# Patient Record
Sex: FEMALE | Race: WHITE | NOT HISPANIC OR LATINO | Employment: OTHER | URBAN - METROPOLITAN AREA
[De-identification: names, ages, dates, MRNs, and addresses within clinical notes are randomized per-mention and may not be internally consistent; named-entity substitution may affect disease eponyms.]

---

## 2023-11-06 ENCOUNTER — EVALUATION (OUTPATIENT)
Dept: OCCUPATIONAL THERAPY | Facility: CLINIC | Age: 68
End: 2023-11-06
Payer: MEDICARE

## 2023-11-06 DIAGNOSIS — M65.332 TRIGGER MIDDLE FINGER OF LEFT HAND: ICD-10-CM

## 2023-11-06 DIAGNOSIS — M65.331 TRIGGER MIDDLE FINGER OF RIGHT HAND: Primary | ICD-10-CM

## 2023-11-06 DIAGNOSIS — M65.30 TRIGGER FINGER: ICD-10-CM

## 2023-11-06 PROCEDURE — 97140 MANUAL THERAPY 1/> REGIONS: CPT | Mod: GO | Performed by: OCCUPATIONAL THERAPIST

## 2023-11-06 PROCEDURE — 97165 OT EVAL LOW COMPLEX 30 MIN: CPT | Mod: GO | Performed by: OCCUPATIONAL THERAPIST

## 2023-11-06 PROCEDURE — 97110 THERAPEUTIC EXERCISES: CPT | Mod: GO | Performed by: OCCUPATIONAL THERAPIST

## 2023-11-06 ASSESSMENT — PAIN SCALES - GENERAL: PAINLEVEL_OUTOF10: 5 - MODERATE PAIN

## 2023-11-06 ASSESSMENT — ACTIVITIES OF DAILY LIVING (ADL): EFFECT OF PAIN ON DAILY ACTIVITIES: PER PATIENT ABLE TO COMPENSATE

## 2023-11-06 ASSESSMENT — PAIN - FUNCTIONAL ASSESSMENT: PAIN_FUNCTIONAL_ASSESSMENT: 0-10

## 2023-11-06 NOTE — PROGRESS NOTES
Occupational Therapy    Evaluation/Treatment    Patient Name: Lottie Rodríguez  MRN: [id number]  : 1955  Today's Date: 23     Time Calculation  Start Time: 1420  Stop Time: 1510  Time Calculation (min): 50 min    Assessment:   -30/ Right 60/95/55  right release 23  -35 left PIP middle finger 60/95/55  Per patient right long finger PIP trigger release in 2023; left long finger locks a couple of times  a day;   OT Assessment Results: Decreased ADL status, Decreased upper extremity range of motion, Decreased upper extremity strength, Decreased fine motor control, Decreased IADLs  Strengths: Ability to acquire knowledge, Coping skills, Insight into problems     Right  37 lbs.; Left  42 lbs.; right 3 jaw 10 lbs.; Left 8 lbs.; difficult to wring out rag and lifting dogfood,   Plan:  1 x week for 4 weeks;           Subjective   Current Problem:  1. Trigger middle finger of right hand  Follow Up In Occupational Therapy      2. Trigger finger  Referral to Occupational Therapy      3. Trigger middle finger of left hand  Follow Up In Occupational Therapy        General:      General  Reason for Referral: Pain bilateral hands weakness and stiffness, loss of ADL function  Preferred Learning Style: verbal, visual, written  Precautions:  Splinting: recommending resting finger extension for night R and L long fingers  Precautions Comment: left finger locking, right PMhx of trigger release with scar tissue     Pain:  Pain Assessment  Pain Assessment: 0-10  Pain Score: 5 - Moderate pain  Pain Type: Acute pain  Pain Location: Finger (Comment which one) (Right Long and Left long finger at PIP and palmar)  Pain Orientation: Right, Left  Pain Frequency: Intermittent  Clinical Progression: Gradually worsening  Effect of Pain on Daily Activities: per patient able to compensate  Patient's Stated Pain Goal: No pain    Objective   Cognition:  Overall Cognitive Status: Within Functional Limits           Home  Living:  Type of Home: House  Lives With: Spouse  Prior Function:  Level of [place]: Independent with ADLs and functional transfers, Independent with homemaking with ambulation  Hand Dominance: Right  IADL History:  Occupation: Retired  ADL:  ADL Comments: self report completed UEFI  Splinting: use of coban and pulley ring for gripping, may benefit from PIP extension or Ted block at night        Therapy/Activity: Therapeutic Exercise  Therapeutic Exercise Performed: Yes  Therapeutic Exercise Activity 1: instructed with AROM of FDS flexion, intrinsic hook stretching completed 10 reps, hold, manual cues for MP extension, use of pen for assist, visual cues, flexor slides, flexion stretch.  Sensation:  Light Touch: No apparent deficits  Sensation Comment: light touch WNL       OP EDUCATION:  Education  Individual(s) Educated: Patient  Education Provided: Diagnosis & Precautions, Symptom management, Joint protection and energy conservation, Orthotics wearing schedule and precautions, Risk and benefits of OT discussed with patient or other, POC discussed and agreed upon  Home Program: AROM, Activity modification, Modalities, Orthotic wearing schedule, care and precautions, Tendon gliding, Wound/scar care, Handout issued  Diagnosis and Precautions: tendon adhesions, weakness of extension, good early flexion of LSM  Risk and Benefits Discussed with Patient/Caregiver/Other: yes  Patient/Caregiver Demonstrated Understanding: yes  Plan of Care Discussed and Agreed Upon: yes  Patient Response to Education: Patient/Caregiver Verbalized Understanding of Information, Patient/Caregiver Performed Return Demonstration of Exercises/Activities, Patient/Caregiver Asked Appropriate Questions    Goals:  Active       OT Problem       PATIENT WILL ACHIEVE bilateral  STRENGTH OF 50lbs. IN THE two position       Start:  11/06/23    Expected End:  01/06/24            PATIENT WILL DEMONSTRATE FULL ROM IN LONG FINGER FLEXION  bilateral (Progressing)       Start:  11/06/23    Expected End:  01/06/24            PATIENT WILL DEMONSTRATE FULL ROM IN LONG FINGER EXTENSION bilateral (Progressing)       Start:  11/06/23    Expected End:  01/06/24            PATIENT WILL SHOW A SIGNIFICANT CHANGE IN UEFI PATIENT REPORTED OUTCOME TOOL TO DEMONSTRATE SUBJECTIVE IMPROVEMENT       Start:  11/06/23    Expected End:  01/06/24            PATIENT WILL DEMONSTRATE INDEPENDENCE IN HOME PROGRAM FOR SUPPORT OF PROGRESSION (Progressing)       Start:  11/06/23    Expected End:  01/06/24            PATIENT WILL REPORT PAIN OF 0-1/10 DEMONSTRATING A REDUCTION OF OVERALL PAIN       Start:  11/06/23    Expected End:  01/06/24

## 2023-11-06 NOTE — Clinical Note
November 6, 2023     Patient: Lottie Rodríguez   YOB: 1955   Date of Visit: 11/6/2023       To Whom It May Concern:    It is my medical opinion that Lottie Rodríguez {Work release (duty restriction):33281}.    If you have any questions or concerns, please don't hesitate to call.         Sincerely,        Mariana Osorio , OT    CC: No Recipients

## 2023-11-06 NOTE — Clinical Note
November 6, 2023     Patient: Lottie Rodríguez   YOB: 1955   Date of Visit: 11/6/2023       To Whom it May Concern:    Lottie Rodríguez was seen in my clinic on 11/6/2023. She {Return to school/sport:62396}.    If you have any questions or concerns, please don't hesitate to call.         Sincerely,          Mariana Osorio , OT        CC: No Recipients

## 2023-11-14 ENCOUNTER — TREATMENT (OUTPATIENT)
Dept: OCCUPATIONAL THERAPY | Facility: CLINIC | Age: 68
End: 2023-11-14
Payer: MEDICARE

## 2023-11-14 ENCOUNTER — LAB (OUTPATIENT)
Dept: LAB | Facility: LAB | Age: 68
End: 2023-11-14
Payer: MEDICARE

## 2023-11-14 DIAGNOSIS — E11.9 TYPE 2 DIABETES MELLITUS WITHOUT COMPLICATIONS (MULTI): ICD-10-CM

## 2023-11-14 DIAGNOSIS — M65.331 TRIGGER MIDDLE FINGER OF RIGHT HAND: ICD-10-CM

## 2023-11-14 DIAGNOSIS — E83.42 HYPOMAGNESEMIA: Primary | ICD-10-CM

## 2023-11-14 DIAGNOSIS — M65.332 TRIGGER MIDDLE FINGER OF LEFT HAND: ICD-10-CM

## 2023-11-14 LAB
ANION GAP SERPL CALC-SCNC: 11 MMOL/L
BUN SERPL-MCNC: 14 MG/DL (ref 8–25)
CALCIUM SERPL-MCNC: 9.7 MG/DL (ref 8.5–10.4)
CHLORIDE SERPL-SCNC: 101 MMOL/L (ref 97–107)
CO2 SERPL-SCNC: 26 MMOL/L (ref 24–31)
CREAT SERPL-MCNC: 0.8 MG/DL (ref 0.4–1.6)
GFR SERPL CREATININE-BSD FRML MDRD: 80 ML/MIN/1.73M*2
GLUCOSE SERPL-MCNC: 135 MG/DL (ref 65–99)
MAGNESIUM SERPL-MCNC: 1.4 MG/DL (ref 1.6–3.1)
POTASSIUM SERPL-SCNC: 5.8 MMOL/L (ref 3.4–5.1)
SODIUM SERPL-SCNC: 138 MMOL/L (ref 133–145)

## 2023-11-14 PROCEDURE — 83735 ASSAY OF MAGNESIUM: CPT

## 2023-11-14 PROCEDURE — 97022 WHIRLPOOL THERAPY: CPT | Mod: GO,CO

## 2023-11-14 PROCEDURE — 36415 COLL VENOUS BLD VENIPUNCTURE: CPT

## 2023-11-14 PROCEDURE — 97110 THERAPEUTIC EXERCISES: CPT | Mod: 59,GO,CO

## 2023-11-14 PROCEDURE — 80048 BASIC METABOLIC PNL TOTAL CA: CPT

## 2023-11-14 PROCEDURE — 97763 ORTHC/PROSTC MGMT SBSQ ENC: CPT | Mod: GO,CO

## 2023-11-14 NOTE — PROGRESS NOTES
"Occupational Therapy Treatment    Patient Name: Lottie Rodríguez  MRN: [id number]  Today's Date: 11/14/2023    Time Calculation  Start Time: 1330  Stop Time: 1430  Time Calculation (min): 60 min  OT Modalities Time Entry  Whirlpool Time Entry: 10  OT Therapeutic Procedures Time Entry  Manual Therapy Time Entry: 10  Orthotic/Prosthetic Mgmt and/or Training (Subs Encounter) Time Entry: 10  Therapeutic Exercise Time Entry: 30  Visit number: 2 of 4  Insurance Type: [employer]    Subjective   Current Problem/Reason for visit:  Bilateral long finger trigger finger, surgery on R finger on 9/1/23     Referred by: Dr Faye    Patient reports \" I have been doing my exercises, massaging my right finger a lot\".     Performing HEP?: Yes    Pain:0/10 at start and at end of therapy 0/10  Pain Assessment  Patient's Stated Pain Goal: No pain    Objective   Writer reviewed goals with pt and progress towards goals at this time, pt verbalized understanding.  Physical Observation: Healed scar. Hard in some areas. Continues with contracture of bilateral long finger PIP's in flexion.   Edema: no edema    Sensory: intact  Numbness/Tingling: none  AROM measured on this date at the following increased levels:  R LF MP at 0/55(unchanged) PIP at -18/(inc 12 degrees)/75(dec 20) and DIP at 72(inc 17)   L LF MP at 0/50(dec 10), PIP at 0/85(dec 10), DIP at 0/ 45(inc 10)      Treatment:    Modalities:   AROM/tendon glides performed while in fluido therapy x 10 min to promote muscle movement during session.     Therapeutic Exercise:  PROM completed by writer with good response and good tolerance by pt.  Marker rolls x 10 reps with focus on 10 sec hold at end of DIP  Educated pt in finger lifts from table with focus on isolation of long finger. Only able to perform with assist of index finger at this time x 10 reps, 5 sec hold  Educated pt in importance of scar massage to promote finger ext.     Writer fabricated bilateral long  finger " orthosis to promote PIP ext.  , promote independence in self scare and reduce am stiffness and pain.   Pt verbalized satisfaction with fit. Pt to wear at night with finger sleeves provided today.     Post-tx pain:0/10    Assessment/Plan  Pt demonstrated good tolerance to PROM. Noted increased PIP ext in both long fingers from start to end of session. Min fatigue. Pt to follow through with use of modalities, added HEP program and splint wear at night.     OP EDUCATION:  Education  Individual(s) Educated: Patient  Education Provided: Diagnosis & Precautions, Symptom management, Joint protection and energy conservation, Orthotics wearing schedule and precautions, Risk and benefits of OT discussed with patient or other, POC discussed and agreed upon  Home Program: AROM, Activity modification, Modalities, Orthotic wearing schedule, care and precautions, Tendon gliding, Wound/scar care, Handout issued  Diagnosis and Precautions: tendon adhesions, weakness of extension, good early flexion of LSM  Risk and Benefits Discussed with Patient/Caregiver/Other: yes  Patient/Caregiver Demonstrated Understanding: yes  Plan of Care Discussed and Agreed Upon: yes  Patient Response to Education: Patient/Caregiver Verbalized Understanding of Information, Patient/Caregiver Performed Return Demonstration of Exercises/Activities, Patient/Caregiver Asked Appropriate Questions    Goals:  Active       OT Problem       PATIENT WILL ACHIEVE bilateral  STRENGTH OF 50lbs. IN THE two position (Progressing)       Start:  11/06/23    Expected End:  01/06/24            PATIENT WILL DEMONSTRATE FULL ROM IN LONG FINGER FLEXION bilateral (Progressing)       Start:  11/06/23    Expected End:  01/06/24            PATIENT WILL DEMONSTRATE FULL ROM IN LONG FINGER EXTENSION bilateral (Progressing)       Start:  11/06/23    Expected End:  01/06/24            PATIENT WILL SHOW A SIGNIFICANT CHANGE IN UEFI PATIENT REPORTED OUTCOME TOOL TO DEMONSTRATE  SUBJECTIVE IMPROVEMENT (Progressing)       Start:  11/06/23    Expected End:  01/06/24            PATIENT WILL DEMONSTRATE INDEPENDENCE IN HOME PROGRAM FOR SUPPORT OF PROGRESSION (Progressing)       Start:  11/06/23    Expected End:  01/06/24            PATIENT WILL REPORT PAIN OF 0-1/10 DEMONSTRATING A REDUCTION OF OVERALL PAIN (Met)       Start:  11/06/23    Expected End:  01/06/24    Resolved:  11/14/23

## 2023-11-21 ENCOUNTER — TREATMENT (OUTPATIENT)
Dept: OCCUPATIONAL THERAPY | Facility: CLINIC | Age: 68
End: 2023-11-21
Payer: MEDICARE

## 2023-11-21 DIAGNOSIS — M65.331 TRIGGER MIDDLE FINGER OF RIGHT HAND: ICD-10-CM

## 2023-11-21 DIAGNOSIS — M65.332 TRIGGER MIDDLE FINGER OF LEFT HAND: ICD-10-CM

## 2023-11-21 PROCEDURE — 97763 ORTHC/PROSTC MGMT SBSQ ENC: CPT | Mod: GO,CO

## 2023-11-21 PROCEDURE — 97022 WHIRLPOOL THERAPY: CPT | Mod: GO,CO

## 2023-11-21 PROCEDURE — 97110 THERAPEUTIC EXERCISES: CPT | Mod: GO,CO

## 2023-11-21 NOTE — PROGRESS NOTES
"Occupational Therapy Treatment    Patient Name: Lottie Rodríguez  MRN: [id number]  Today's Date: 11/21/2023    Time Calculation  Start Time: 1347  Stop Time: 1437  Time Calculation (min): 50 min  OT Modalities Time Entry  Whirlpool Time Entry: 10  OT Therapeutic Procedures Time Entry  Orthotic/Prosthetic Mgmt and/or Training (Subs Encounter) Time Entry: 10  Therapeutic Exercise Time Entry: 30  Visit number: 3 of 4  Insurance Type: [employer]    Subjective   Current Problem/Reason for visit:  Bilateral long finger trigger finger, surgery on R finger on 9/1/23     Referred by: Dr Yunier del valle 12/7/23    Patient reports \" I have been wearing my splints at night. Moving and stretching my finger a lot when I am watching tv.\"    Performing HEP?: Yes    Pain:0/10 at start and at end of therapy 0/10  Pain Assessment  Patient's Stated Pain Goal: No pain    Objective   Writer reviewed goals with pt and progress towards goals at this time, pt verbalized understanding.  Physical Observation: Healed scar. Hard in some areas. Reviewed and demonstrated scar massage and pressure required to reduce bulk. Pt verbalized understanding.   Continues with contracture of bilateral long finger PIP's in flexion. Pt is using heat prior to exercise 1-2 times daily and takes over the counter anti-inflammatory meds for all over pain up to twice a day.   Edema: Trace  edema    Sensory: intact  Numbness/Tingling: none  AROM measured on this date at the following increased levels:  R LF MP at 0/65(inc 10) PIP at -15/(inc 3 degrees)/80(inc 10) and DIP at 75(inc 3)     Treatment:    Modalities:   AROM/tendon glides performed while in fluido therapy x 10 min to promote muscle movement during session.     Therapeutic Exercise:  PROM completed by writer with good response and good tolerance by pt.  Marker rolls x 10 reps with focus on 10 sec hold at end of DIP  Educated pt in finger lifts from table with focus on isolation of long finger. Only able " to perform with assist of index finger at this time x 10 reps, 5 sec hold  Reinforced education of pt in importance of scar massage to promote finger ext.   Re-assessed fit of both LF splints, provided pt with replacement sleeves for wear under splint. Increased extension of L LF, pt verbalized comfort with fit. No changes needed for R LF.     Post-tx pain:0/10. Min fatigue.     Assessment/Plan  Pt demonstrated good tolerance to PROM. Noted increased PIP ext in both long fingers from start to end of session. Min fatigue. Pt to follow through with use of modalities, added HEP program and splint wear at night. Pt motivated to return to full use of Bilateral long fingers.     OP EDUCATION:  Education  Individual(s) Educated: Patient  Education Provided: Diagnosis & Precautions, Symptom management, Joint protection and energy conservation, Orthotics wearing schedule and precautions, Risk and benefits of OT discussed with patient or other, POC discussed and agreed upon  Home Program: AROM, Activity modification, Modalities, Orthotic wearing schedule, care and precautions, Tendon gliding, Wound/scar care, Handout issued  Diagnosis and Precautions: tendon adhesions, weakness of extension, good early flexion of LSM  Risk and Benefits Discussed with Patient/Caregiver/Other: yes  Patient/Caregiver Demonstrated Understanding: yes  Plan of Care Discussed and Agreed Upon: yes  Patient Response to Education: Patient/Caregiver Verbalized Understanding of Information, Patient/Caregiver Performed Return Demonstration of Exercises/Activities, Patient/Caregiver Asked Appropriate Questions    Goals:  Active       OT Problem       PATIENT WILL ACHIEVE bilateral  STRENGTH OF 50lbs. IN THE two position (Progressing)       Start:  11/06/23    Expected End:  01/06/24            PATIENT WILL DEMONSTRATE FULL ROM IN LONG FINGER FLEXION bilateral (Met)       Start:  11/06/23    Expected End:  01/06/24    Resolved:  11/21/23         PATIENT  WILL DEMONSTRATE FULL ROM IN LONG FINGER EXTENSION bilateral (Progressing)       Start:  11/06/23    Expected End:  01/06/24            PATIENT WILL SHOW A SIGNIFICANT CHANGE IN UEFI PATIENT REPORTED OUTCOME TOOL TO DEMONSTRATE SUBJECTIVE IMPROVEMENT (Progressing)       Start:  11/06/23    Expected End:  01/06/24            PATIENT WILL DEMONSTRATE INDEPENDENCE IN HOME PROGRAM FOR SUPPORT OF PROGRESSION (Met)       Start:  11/06/23    Expected End:  01/06/24    Resolved:  11/21/23         PATIENT WILL REPORT PAIN OF 0-1/10 DEMONSTRATING A REDUCTION OF OVERALL PAIN (Met)       Start:  11/06/23    Expected End:  01/06/24    Resolved:  11/14/23

## 2023-11-28 ENCOUNTER — APPOINTMENT (OUTPATIENT)
Dept: OCCUPATIONAL THERAPY | Facility: CLINIC | Age: 68
End: 2023-11-28
Payer: MEDICARE

## 2023-12-05 ENCOUNTER — TREATMENT (OUTPATIENT)
Dept: OCCUPATIONAL THERAPY | Facility: CLINIC | Age: 68
End: 2023-12-05
Payer: MEDICARE

## 2023-12-05 DIAGNOSIS — M65.332 TRIGGER MIDDLE FINGER OF LEFT HAND: ICD-10-CM

## 2023-12-05 DIAGNOSIS — M65.331 TRIGGER MIDDLE FINGER OF RIGHT HAND: ICD-10-CM

## 2023-12-05 PROCEDURE — 97110 THERAPEUTIC EXERCISES: CPT | Mod: GO,CO

## 2023-12-05 PROCEDURE — 97022 WHIRLPOOL THERAPY: CPT | Mod: GO,CO

## 2023-12-05 PROCEDURE — 97763 ORTHC/PROSTC MGMT SBSQ ENC: CPT | Mod: GO,CO

## 2023-12-05 NOTE — PROGRESS NOTES
"Occupational Therapy Treatment    Patient Name: Lottie Rodríguez  MRN: [id number]  Today's Date: 12/5/2023    Time Calculation  Start Time: 1337  Stop Time: 1424  Time Calculation (min): 47 min  OT Modalities Time Entry  Whirlpool Time Entry: 16  OT Therapeutic Procedures Time Entry  Orthotic/Prosthetic Mgmt and/or Training (Subs Encounter) Time Entry: 10  Therapeutic Exercise Time Entry: 21  Insurance:  Visit number: 4 of 4  Insurance Type: [employer]    Subjective   Current Problem/Reason for visit:  Bilateral long finger trigger finger, surgery on R finger on 9/1/23       Referred by: Yunier, follow up on 12/8/23    Patient reports \" I can feel a difference, I notice I can do a lot more and my fingers look better\". Pt wearing finger ext splints at night. Writer adjusted on this date to promote increased LF PIP ext. Pt also provided with one layer of coban under splint to keep them in place when wearing at night.     Performing HEP?: Yes    Pain:0/10  Pain Assessment  Patient's Stated Pain Goal: No pain    Objective     UEFI completed by pt at 80/80 today (was 61/80 at [facility])  Edema: trace to none, noted decrease since last session.     Sensory: intact  Numbness/Tingling: none noted.       AROM measured on this date at the following increased levels:  R LF PIP -12 ext(was -30) and L LF PIP ext -12(was -35) All other flexion/ext WFL.    measured today at :46# R hand(was 45) and 46# L hand (was 40)  3 point pinch measured today at:  13.5# in R hand (inc by 3.5) and 13.5 in L hand (inc 5.5)    Treatment:    Modalities:   AROM/tendon glides performed while in fluido therapy x 8  min  with B Ue's to promote muscle movement during session.     Therapeutic Exercise:  PROM completed by writer with good response and good tolerance by pt.  Marker rolls x 10 reps with focus on 10 sec hold at end of DIP  Pt completed finger lifts from table with focus on isolation of long finger. Only able to perform with assist of " index finger at this time x 10 reps, 5 sec hold  Reinforced education of pt in importance of scar massage to promote finger ext.     Re-assessed fit of both LF splints, provided pt with coban for wear under splint. Increased extension of both with focus on PIP. Pt verbalized comfort with wear.      Post-tx pain:0/10 No  fatigue.     Assessment/Plan  Pt demonstrated good tolerance to PROM. Noted increased PIP ext in both long fingers from start to end of session. Pt to follow through with use of modalities, added HEP program and splint wear at night. Pt motivated to return to full use of Bilateral long fingers.  To possibly provided pt with low resistance theraputty at next visit.     OP EDUCATION:  Education  Individual(s) Educated: Patient  Education Provided: Diagnosis & Precautions, Symptom management, Joint protection and energy conservation, Orthotics wearing schedule and precautions, Risk and benefits of OT discussed with patient or other, POC discussed and agreed upon  Home Program: AROM, Activity modification, Modalities, Orthotic wearing schedule, care and precautions, Tendon gliding, Wound/scar care, Handout issued  Diagnosis and Precautions: tendon adhesions, weakness of extension, good early flexion of LSM  Risk and Benefits Discussed with Patient/Caregiver/Other: yes  Patient/Caregiver Demonstrated Understanding: yes  Plan of Care Discussed and Agreed Upon: yes  Patient Response to Education: Patient/Caregiver Verbalized Understanding of Information, Patient/Caregiver Performed Return Demonstration of Exercises/Activities, Patient/Caregiver Asked Appropriate Questions    Goals:  Active       OT Problem       PATIENT WILL ACHIEVE bilateral  STRENGTH OF 50lbs. IN THE two position (Progressing)       Start:  11/06/23    Expected End:  01/06/24            PATIENT WILL DEMONSTRATE FULL ROM IN LONG FINGER FLEXION bilateral (Met)       Start:  11/06/23    Expected End:  01/06/24    Resolved:  11/21/23          PATIENT WILL DEMONSTRATE FULL ROM IN LONG FINGER EXTENSION bilateral (Progressing)       Start:  11/06/23    Expected End:  01/06/24            PATIENT WILL SHOW A SIGNIFICANT CHANGE IN UEFI PATIENT REPORTED OUTCOME TOOL TO DEMONSTRATE SUBJECTIVE IMPROVEMENT (Progressing)       Start:  11/06/23    Expected End:  01/06/24            PATIENT WILL DEMONSTRATE INDEPENDENCE IN HOME PROGRAM FOR SUPPORT OF PROGRESSION (Met)       Start:  11/06/23    Expected End:  01/06/24    Resolved:  11/21/23         PATIENT WILL REPORT PAIN OF 0-1/10 DEMONSTRATING A REDUCTION OF OVERALL PAIN (Met)       Start:  11/06/23    Expected End:  01/06/24    Resolved:  11/14/23

## 2023-12-12 ENCOUNTER — TREATMENT (OUTPATIENT)
Dept: OCCUPATIONAL THERAPY | Facility: CLINIC | Age: 68
End: 2023-12-12
Payer: MEDICARE

## 2023-12-12 DIAGNOSIS — M65.331 TRIGGER MIDDLE FINGER OF RIGHT HAND: ICD-10-CM

## 2023-12-12 DIAGNOSIS — M65.332 TRIGGER MIDDLE FINGER OF LEFT HAND: ICD-10-CM

## 2023-12-12 PROCEDURE — 97140 MANUAL THERAPY 1/> REGIONS: CPT | Mod: GO | Performed by: OCCUPATIONAL THERAPIST

## 2023-12-12 PROCEDURE — 97110 THERAPEUTIC EXERCISES: CPT | Mod: GO | Performed by: OCCUPATIONAL THERAPIST

## 2023-12-12 PROCEDURE — 97530 THERAPEUTIC ACTIVITIES: CPT | Mod: GO | Performed by: OCCUPATIONAL THERAPIST

## 2023-12-12 ASSESSMENT — PAIN SCALES - GENERAL: PAINLEVEL_OUTOF10: 1

## 2023-12-12 ASSESSMENT — PAIN DESCRIPTION - DESCRIPTORS: DESCRIPTORS: SORE;TENDER

## 2023-12-12 ASSESSMENT — PAIN - FUNCTIONAL ASSESSMENT: PAIN_FUNCTIONAL_ASSESSMENT: 0-10

## 2023-12-12 NOTE — PROGRESS NOTES
Occupational Therapy    Treatment    Patient Name: Lottie Rodríguez  MRN: [id number]  : 1955  Today's Date: 23     Time Calculation  Start Time: 1345  Stop Time: 1430  Time Calculation (min): 45 min    Assessment: Recommending to continue for 3 more visits for recheck of dynamic splint fit and PIP joint ROM; measure  next visit.   Pt is doing well, slight PIP joint contracture, order for PIP ext dynamic splint issued and fitted by [facility] order completed; improving with less PIP extension and increasing CUEVAS   RIGHT AROM -25/95 PIP    Left today:  LEFT -20 PIP extension with left long finger 60/95/55 CUEVAS 190 degrees  (was -35/95 PIP middle finger )  Per patient right long finger PIP trigger release in 2023; left finger no longer locking daily;    OT Assessment Results: Decreased ADL status, Decreased upper extremity range of motion, Decreased upper extremity strength, Decreased fine motor control, Decreased IADLs     Right  37 lbs.; Left  42 lbs.; right 3 jaw 10 lbs.; Left 8 lbs.; difficult to wring out rag and lifting dogfood,   Plan:  1 x week for 4 weeks;  this is 4th visit, continue for 3 more visits.          Subjective   Current Problem:  1. Trigger middle finger of right hand  Follow Up In Occupational Therapy      2. Trigger middle finger of left hand  Follow Up In Occupational Therapy        General:      General  Reason for Referral: Pain bilateral hands weakness and stiffness, loss of ADL function  Preferred Learning Style: verbal, visual, written  Precautions:  Splinting: right and left PIP extension splint at night  Precautions Comment: patient reports less left finger locking, and right long finger feeling better, splint at night     Pain:  Pain Assessment  Pain Assessment: 0-10  Pain Score: 1  Pain Location: Finger (Comment which one)  Pain Descriptors: Sore, Tender  Patient's Stated Pain Goal: No pain    Objective      Splinting: use of coban and pulley ring for gripping, may  benefit from PIP extension or Ted block at night  Location: dynamic ext PIP  Type: finger extension  Splinting Education: Fitting, Wear schedule, Precautions, [place], [place]  Splinting Prefabricated: Other (Comment)  Splinting Comments: LMB for PIP Bregs     Therapy/Activity: Therapeutic Exercise  Therapeutic Exercise Performed: Yes  Therapeutic Exercise Activity 1: Pt completed  AROM of FDS flexion, intrinsic hook stretching completed 10 reps, hold, manual cues for MP extension, use of pen for assist, visual cues, flexor slides, flexion stretch.  Therapeutic Exercise Activity 2: Educated pt in marker rolls on table for long extensor stretch.     OP EDUCATION:  Education  Individual(s) Educated: Patient  Education Provided: Diagnosis & Precautions, Symptom management, Joint protection and energy conservation, Orthotics wearing schedule and precautions, Risk and benefits of OT discussed with patient or other, POC discussed and agreed upon  Home Program: AROM, Activity modification, Modalities, Orthotic wearing schedule, care and precautions, Tendon gliding, Handout issued, AAROM  Risk and Benefits Discussed with Patient/Caregiver/Other: yes  Patient/Caregiver Demonstrated Understanding: yes  Plan of Care Discussed and Agreed Upon: yes  Patient Response to Education: Patient/Caregiver Verbalized Understanding of Information, Patient/Caregiver Performed Return Demonstration of Exercises/Activities, Patient/Caregiver Asked Appropriate Questions    Goals:  Active       OT Problem       PATIENT WILL ACHIEVE bilateral  STRENGTH OF 50lbs. IN THE two position (Progressing)       Start:  11/06/23    Expected End:  01/06/24            PATIENT WILL DEMONSTRATE FULL ROM IN LONG FINGER FLEXION bilateral (Met)       Start:  11/06/23    Expected End:  01/06/24    Resolved:  11/21/23         PATIENT WILL DEMONSTRATE FULL ROM IN LONG FINGER EXTENSION bilateral (Progressing)       Start:  11/06/23    Expected End:  01/06/24             PATIENT WILL SHOW A SIGNIFICANT CHANGE IN UEFI PATIENT REPORTED OUTCOME TOOL TO DEMONSTRATE SUBJECTIVE IMPROVEMENT (Progressing)       Start:  11/06/23    Expected End:  01/06/24            PATIENT WILL DEMONSTRATE INDEPENDENCE IN HOME PROGRAM FOR SUPPORT OF PROGRESSION (Met)       Start:  11/06/23    Expected End:  01/06/24    Resolved:  11/21/23         PATIENT WILL REPORT PAIN OF 0-1/10 DEMONSTRATING A REDUCTION OF OVERALL PAIN (Met)       Start:  11/06/23    Expected End:  01/06/24    Resolved:  11/14/23

## 2023-12-19 ENCOUNTER — TREATMENT (OUTPATIENT)
Dept: OCCUPATIONAL THERAPY | Facility: CLINIC | Age: 68
End: 2023-12-19
Payer: MEDICARE

## 2023-12-19 DIAGNOSIS — M65.331 TRIGGER MIDDLE FINGER OF RIGHT HAND: ICD-10-CM

## 2023-12-19 DIAGNOSIS — M65.332 TRIGGER MIDDLE FINGER OF LEFT HAND: ICD-10-CM

## 2023-12-19 PROCEDURE — 97140 MANUAL THERAPY 1/> REGIONS: CPT | Mod: GO | Performed by: OCCUPATIONAL THERAPIST

## 2023-12-19 PROCEDURE — 97530 THERAPEUTIC ACTIVITIES: CPT | Mod: GO | Performed by: OCCUPATIONAL THERAPIST

## 2023-12-19 PROCEDURE — 97110 THERAPEUTIC EXERCISES: CPT | Mod: GO | Performed by: OCCUPATIONAL THERAPIST

## 2023-12-19 ASSESSMENT — PAIN SCALES - GENERAL: PAINLEVEL_OUTOF10: 0 - NO PAIN

## 2023-12-19 ASSESSMENT — PAIN - FUNCTIONAL ASSESSMENT: PAIN_FUNCTIONAL_ASSESSMENT: 0-10

## 2023-12-19 NOTE — PROGRESS NOTES
Occupational Therapy    Treatment    Patient Name: Lottie Rodríguez  MRN: [id number]  : 1955  Today's Date: 23     Time Calculation  Start Time: 1335  Stop Time: 1420  Time Calculation (min): 45 min    Assessment: Recommending to continue for 1 more visits for recheck of dynamic splint fit and PIP joint ROM; measure  next visit.   Pt is doing well, completed most goals with good understanding of her management and ROM exercises; RIGHT AROM -20/100 PIP  70/100/55 CUEVAS 205   Left today:  LEFT -18 PIP extension with left long finger 75/105/50 CUEVAS 210 degrees  (was -35/95 PIP middle finger )  Per patient right long finger PIP trigger release in 2023; left finger no longer locking daily;    OT Assessment Results: Decreased ADL status, Decreased upper extremity range of motion, Decreased upper extremity strength, Decreased fine motor control, Decreased IADLs     Right  47 lbs.; Left  45 lbs.; right 3 jaw 10 lbs.; Left 8 lbs.; improved opening doors and wringing out rag and improved lifting dogfood,   Plan:  1 x week for 7 weeks;  this is  6th visit, continue for 1 more visits.          Subjective   Current Problem:  1. Trigger middle finger of right hand  Follow Up In Occupational Therapy    Follow Up In Occupational Therapy      2. Trigger middle finger of left hand  Follow Up In Occupational Therapy    Follow Up In Occupational Therapy        General:      General  Reason for Referral: Pain bilateral hands weakness and stiffness, loss of ADL function  Referred By: Dr. Faye  Preferred Learning Style: verbal, visual, written  Precautions:        Pain:  Pain Assessment  Pain Assessment: 0-10  Pain Score: 0 - No pain  Patient's Stated Pain Goal: No pain    Objective      Splinting: use of coban and pulley ring for gripping, continue PIP extension and extension splints at night  Location: continue with dynamic  Type: PIP extension  Splinting Education: Fitting, Wear schedule, Precautions,  [place], [place]  Splinting Prefabricated: Other (Comment)     Therapy/Activity: Therapeutic Exercise  Therapeutic Exercise Performed: Yes  Therapeutic Exercise Activity 1: Pt completed  AROM of FDS flexion, intrinsic hook stretching completed 10 reps, hold, manual cues for MP extension, use of pen for assist, visual cues, flexor slides, flexion stretch.  Therapeutic Exercise Activity 2: completed 1 min bilateral hands with marker rolls on table for long extensor stretch.     OP EDUCATION:  Education  Individual(s) Educated: Patient  Education Provided: Diagnosis & Precautions, Symptom management, Joint protection and energy conservation, Orthotics wearing schedule and precautions, Risk and benefits of OT discussed with patient or other, POC discussed and agreed upon  Home Program: AROM, Activity modification, Modalities, Orthotic wearing schedule, care and precautions, Tendon gliding, Handout issued, AAROM  Risk and Benefits Discussed with Patient/Caregiver/Other: yes  Patient/Caregiver Demonstrated Understanding: yes  Plan of Care Discussed and Agreed Upon: yes  Patient Response to Education: Patient/Caregiver Verbalized Understanding of Information, Patient/Caregiver Performed Return Demonstration of Exercises/Activities, Patient/Caregiver Asked Appropriate Questions    Goals:  Active       OT Problem       PATIENT WILL ACHIEVE bilateral  STRENGTH OF 50lbs. IN THE two position (Progressing)       Start:  11/06/23    Expected End:  01/06/24            PATIENT WILL DEMONSTRATE FULL ROM IN LONG FINGER FLEXION bilateral (Met)       Start:  11/06/23    Expected End:  01/06/24    Resolved:  11/21/23         PATIENT WILL DEMONSTRATE FULL ROM IN LONG FINGER EXTENSION bilateral (Progressing)       Start:  11/06/23    Expected End:  01/06/24            PATIENT WILL SHOW A SIGNIFICANT CHANGE IN UEFI PATIENT REPORTED OUTCOME TOOL TO DEMONSTRATE SUBJECTIVE IMPROVEMENT (Met)       Start:  11/06/23    Expected End:   01/06/24    Resolved:  12/19/23         PATIENT WILL DEMONSTRATE INDEPENDENCE IN HOME PROGRAM FOR SUPPORT OF PROGRESSION (Met)       Start:  11/06/23    Expected End:  01/06/24    Resolved:  11/21/23         PATIENT WILL REPORT PAIN OF 0-1/10 DEMONSTRATING A REDUCTION OF OVERALL PAIN (Met)       Start:  11/06/23    Expected End:  01/06/24    Resolved:  11/14/23

## 2024-01-02 ENCOUNTER — TREATMENT (OUTPATIENT)
Dept: OCCUPATIONAL THERAPY | Facility: CLINIC | Age: 69
End: 2024-01-02
Payer: MEDICARE

## 2024-01-02 DIAGNOSIS — M65.331 TRIGGER MIDDLE FINGER OF RIGHT HAND: ICD-10-CM

## 2024-01-02 DIAGNOSIS — M65.332 TRIGGER MIDDLE FINGER OF LEFT HAND: ICD-10-CM

## 2024-01-02 PROCEDURE — 97110 THERAPEUTIC EXERCISES: CPT | Mod: GO,CO

## 2024-01-02 PROCEDURE — 97022 WHIRLPOOL THERAPY: CPT | Mod: GO,CO

## 2024-01-02 ASSESSMENT — PAIN SCALES - GENERAL: PAINLEVEL_OUTOF10: 0 - NO PAIN

## 2024-01-02 ASSESSMENT — PAIN - FUNCTIONAL ASSESSMENT: PAIN_FUNCTIONAL_ASSESSMENT: 0-10

## 2024-01-02 NOTE — PROGRESS NOTES
"Occupational Therapy    Occupational Therapy Discharge [facility]    Patient Name: Lottie Rodríguez  MRN: [id number]  Today's Date: 1/2/2024    Subjective   Current Problem/Reason for visit:  Bilateral long finger trigger finger, surgery on R finger on 9/1/23        Referred by: Yunier, discharged from doctor.      Patient reports \" I am doing really good. \" Pt wearing finger ext splints at night. No problems noted with fit. Pt Using heat PRN.Goals reviewed with pt, she verbalized understanding.   Pt wearing dynamic PIP ext splint during the day and stated \" I feel like it is really helping\".   Pain:  Pain Assessment  Pain Assessment: 0-10  Pain Score: 0 - No pain  Patient's Stated Pain Goal: No pain    Objective   Interventions:  Scar massage, manual, fluido therapy, splinting, ultrasound, PROM, AROM, activity modification, ther exer, there act. Wound care and tendon gliding     Extremity Assessments:  UEFI completed by pt at 80/80  (was 61/80 at eval)  Sensory: intact  Numbness/Tingling: none noted.   Edema, none noted.   Gross grasp measured today at  35 #(was 37 at eval) in R UE and 44#(was 42 at eval)   in L UE     AROM measured on this date at the following increased levels:  R LF PIP -12 ext(was -30) and L LF PIP ext -12(was -35) All other flexion/ext WFL.   3 point pinch measured at:  13.5# in R hand (inc by 3.5) and 13.5 in L hand (inc 5.5)       Assessment/Plan      Pt feels like she has all of the tools in place to follow through with HEP. Stated ' my Left hand looks really good, even though I didn't have surgery on that one.\" Pt motivated to return to full function, wearing splints at night. Recommend discharge from OT services on this date due to met max function towards all goals.           Goals:  Active       OT Problem       PATIENT WILL ACHIEVE bilateral  STRENGTH OF 50lbs. IN THE two position (Progressing)       Start:  11/06/23    Expected End:  01/06/24            PATIENT WILL DEMONSTRATE FULL " ROM IN LONG FINGER FLEXION bilateral (Met)       Start:  11/06/23    Expected End:  01/06/24    Resolved:  11/21/23         PATIENT WILL DEMONSTRATE FULL ROM IN LONG FINGER EXTENSION bilateral (Progressing)       Start:  11/06/23    Expected End:  01/06/24            PATIENT WILL SHOW A SIGNIFICANT CHANGE IN UEFI PATIENT REPORTED OUTCOME TOOL TO DEMONSTRATE SUBJECTIVE IMPROVEMENT (Met)       Start:  11/06/23    Expected End:  01/06/24    Resolved:  12/19/23         PATIENT WILL DEMONSTRATE INDEPENDENCE IN HOME PROGRAM FOR SUPPORT OF PROGRESSION (Met)       Start:  11/06/23    Expected End:  01/06/24    Resolved:  11/21/23         PATIENT WILL REPORT PAIN OF 0-1/10 DEMONSTRATING A REDUCTION OF OVERALL PAIN (Met)       Start:  11/06/23    Expected End:  01/06/24    Resolved:  11/14/23

## 2024-01-02 NOTE — PROGRESS NOTES
"Occupational Therapy    Occupational Therapy Treatment    Patient Name: Lottie Rodríguez  MRN: [id number]  Today's Date: 1/2/2024    Time Calculation  Start Time: 1352  Stop Time: 1428  Time Calculation (min): 36 min  OT Modalities Time Entry  [place] Time Entry: 20  OT Therapeutic Procedures Time Entry  Therapeutic Exercise Time Entry: 16  Insurance:  Visit number: 7 of 7  Insurance Type: [employer]    Subjective   Current Problem/Reason for visit:  Bilateral long finger trigger finger, surgery on R finger on 9/1/23       Referred by: Yunier, discharged from doctor.     Patient reports \" I am doing really good. \" Pt wearing finger ext splints at night. No problems noted with fit. Pt Using heat PRN.Goals reviewed with pt, she verbalized understanding.   Pt wearing dynamic PIP ext splint during the day and stated \" I feel like it is really helping\".     Performing HEP?: Yes    Pain:0/10  Pain Assessment  Pain Assessment: 0-10  Pain Score: 0 - No pain  Patient's Stated Pain Goal: No pain    Objective     UEFI completed by pt at 80/80  (was 61/80 at eval)  Sensory: intact  Numbness/Tingling: none noted.   Edema, none noted.   Gross grasp measured today at  35 #(was 37 at eval) in R UE and 44#(was 42 at eval)   in L UE    AROM measured on this date at the following increased levels:  R LF PIP -12 ext(was -30) and L LF PIP ext -12(was -35) All other flexion/ext WFL.   3 point pinch measured at:  13.5# in R hand (inc by 3.5) and 13.5 in L hand (inc 5.5)    Treatment:    Modalities:   AROM/tendon glides performed while in fluido therapy x 10  min  with   B Ue's to promote muscle movement during session.     Therapeutic Exercise:  Pt completed 10 reps of finger lifts, marker rolls and PIP extension against table with 5 sec  hold. Continuing as part of HEP   Reviewed existing HEP's for theraputty, isometric foam and rubber band ext. No problems noted.     Post-tx pain:0/10 No  fatigue.     Assessment/Plan   Pt feels " "like she has all of the tools in place to follow through with HEP. Stated ' my Left hand looks really good, even though I didn't have surgery on that one.\" Pt motivated to return to full function, wearing splints at night. Recommend discharge from OT services on this date due to met max function towards all goals.     Goals:  OT Problem         PATIENT WILL ACHIEVE bilateral  STRENGTH OF 50lbs. IN THE two position (Progressing)         Start:  11/06/23    Expected End:  01/06/24              PATIENT WILL DEMONSTRATE FULL ROM IN LONG FINGER FLEXION bilateral (Met)         Start:  11/06/23    Expected End:  01/06/24    Resolved:  11/21/23           PATIENT WILL DEMONSTRATE FULL ROM IN LONG FINGER EXTENSION bilateral (Progressing)         Start:  11/06/23    Expected End:  01/06/24              PATIENT WILL SHOW A SIGNIFICANT CHANGE IN UEFI PATIENT REPORTED OUTCOME TOOL TO DEMONSTRATE SUBJECTIVE IMPROVEMENT (met)         Start:  11/06/23    Expected End:  01/06/24              PATIENT WILL DEMONSTRATE INDEPENDENCE IN HOME PROGRAM FOR SUPPORT OF PROGRESSION (Met)         Start:  11/06/23    Expected End:  01/06/24    Resolved:  11/21/23           PATIENT WILL REPORT PAIN OF 0-1/10 DEMONSTRATING A REDUCTION OF OVERALL PAIN (Met)         Start:  11/06/23    Expected End:  01/06/24    Resolved:  11/14/23              "

## 2024-01-04 ENCOUNTER — PROCEDURE VISIT (OUTPATIENT)
Dept: ORTHOPEDIC SURGERY | Facility: CLINIC | Age: 69
End: 2024-01-04
Payer: MEDICARE

## 2024-01-25 ENCOUNTER — LAB (OUTPATIENT)
Dept: LAB | Facility: LAB | Age: 69
End: 2024-01-25
Payer: MEDICARE

## 2024-01-25 DIAGNOSIS — E11.8 TYPE 2 DIABETES MELLITUS WITH UNSPECIFIED COMPLICATIONS (MULTI): Primary | ICD-10-CM

## 2024-01-25 LAB
ANION GAP SERPL CALC-SCNC: 12 MMOL/L
BUN SERPL-MCNC: 26 MG/DL (ref 8–25)
CALCIUM SERPL-MCNC: 10 MG/DL (ref 8.5–10.4)
CHLORIDE SERPL-SCNC: 101 MMOL/L (ref 97–107)
CO2 SERPL-SCNC: 26 MMOL/L (ref 24–31)
CREAT SERPL-MCNC: 1.2 MG/DL (ref 0.4–1.6)
EGFRCR SERPLBLD CKD-EPI 2021: 49 ML/MIN/1.73M*2
EST. AVERAGE GLUCOSE BLD GHB EST-MCNC: 143 MG/DL
GLUCOSE SERPL-MCNC: 165 MG/DL (ref 65–99)
HBA1C MFR BLD: 6.6 %
MAGNESIUM SERPL-MCNC: 1.8 MG/DL (ref 1.6–3.1)
POTASSIUM SERPL-SCNC: 6.1 MMOL/L (ref 3.4–5.1)
SODIUM SERPL-SCNC: 139 MMOL/L (ref 133–145)

## 2024-01-25 PROCEDURE — 80048 BASIC METABOLIC PNL TOTAL CA: CPT

## 2024-01-25 PROCEDURE — 83036 HEMOGLOBIN GLYCOSYLATED A1C: CPT

## 2024-01-25 PROCEDURE — 36415 COLL VENOUS BLD VENIPUNCTURE: CPT

## 2024-01-25 PROCEDURE — 83735 ASSAY OF MAGNESIUM: CPT

## 2024-02-06 ENCOUNTER — LAB (OUTPATIENT)
Dept: LAB | Facility: LAB | Age: 69
End: 2024-02-06
Payer: MEDICARE

## 2024-02-06 DIAGNOSIS — E87.5 HYPERKALEMIA: Primary | ICD-10-CM

## 2024-02-06 LAB
ANION GAP SERPL CALC-SCNC: 16 MMOL/L
BUN SERPL-MCNC: 26 MG/DL (ref 8–25)
CALCIUM SERPL-MCNC: 9.2 MG/DL (ref 8.5–10.4)
CHLORIDE SERPL-SCNC: 101 MMOL/L (ref 97–107)
CO2 SERPL-SCNC: 24 MMOL/L (ref 24–31)
CREAT SERPL-MCNC: 1.4 MG/DL (ref 0.4–1.6)
EGFRCR SERPLBLD CKD-EPI 2021: 41 ML/MIN/1.73M*2
GLUCOSE SERPL-MCNC: 179 MG/DL (ref 65–99)
POTASSIUM SERPL-SCNC: 4.9 MMOL/L (ref 3.4–5.1)
SODIUM SERPL-SCNC: 141 MMOL/L (ref 133–145)

## 2024-02-06 PROCEDURE — 80048 BASIC METABOLIC PNL TOTAL CA: CPT

## 2024-02-06 PROCEDURE — 36415 COLL VENOUS BLD VENIPUNCTURE: CPT

## 2024-04-24 ENCOUNTER — LAB (OUTPATIENT)
Dept: LAB | Facility: LAB | Age: 69
End: 2024-04-24
Payer: MEDICARE

## 2024-04-24 DIAGNOSIS — E11.8 TYPE 2 DIABETES MELLITUS WITH UNSPECIFIED COMPLICATIONS (MULTI): Primary | ICD-10-CM

## 2024-04-24 LAB
ALBUMIN SERPL-MCNC: 4.5 G/DL (ref 3.5–5)
ALP BLD-CCNC: 68 U/L (ref 35–125)
ALT SERPL-CCNC: 13 U/L (ref 5–40)
ANION GAP SERPL CALC-SCNC: 15 MMOL/L
AST SERPL-CCNC: 14 U/L (ref 5–40)
BASOPHILS # BLD AUTO: 0.06 X10*3/UL (ref 0–0.1)
BASOPHILS NFR BLD AUTO: 0.9 %
BILIRUB SERPL-MCNC: 0.4 MG/DL (ref 0.1–1.2)
BUN SERPL-MCNC: 19 MG/DL (ref 8–25)
CALCIUM SERPL-MCNC: 9.9 MG/DL (ref 8.5–10.4)
CHLORIDE SERPL-SCNC: 103 MMOL/L (ref 97–107)
CHOLEST SERPL-MCNC: 165 MG/DL (ref 133–200)
CHOLEST/HDLC SERPL: 3.5 {RATIO}
CO2 SERPL-SCNC: 23 MMOL/L (ref 24–31)
CREAT SERPL-MCNC: 0.8 MG/DL (ref 0.4–1.6)
EGFRCR SERPLBLD CKD-EPI 2021: 80 ML/MIN/1.73M*2
EOSINOPHIL # BLD AUTO: 0.33 X10*3/UL (ref 0–0.7)
EOSINOPHIL NFR BLD AUTO: 4.7 %
ERYTHROCYTE [DISTWIDTH] IN BLOOD BY AUTOMATED COUNT: 12.3 % (ref 11.5–14.5)
EST. AVERAGE GLUCOSE BLD GHB EST-MCNC: 148 MG/DL
GLUCOSE SERPL-MCNC: 166 MG/DL (ref 65–99)
HBA1C MFR BLD: 6.8 %
HCT VFR BLD AUTO: 38.2 % (ref 36–46)
HDLC SERPL-MCNC: 47 MG/DL
HGB BLD-MCNC: 12.1 G/DL (ref 12–16)
IMM GRANULOCYTES # BLD AUTO: 0.04 X10*3/UL (ref 0–0.7)
IMM GRANULOCYTES NFR BLD AUTO: 0.6 % (ref 0–0.9)
LDLC SERPL CALC-MCNC: 58 MG/DL (ref 65–130)
LYMPHOCYTES # BLD AUTO: 1.63 X10*3/UL (ref 1.2–4.8)
LYMPHOCYTES NFR BLD AUTO: 23.2 %
MCH RBC QN AUTO: 33 PG (ref 26–34)
MCHC RBC AUTO-ENTMCNC: 31.7 G/DL (ref 32–36)
MCV RBC AUTO: 104 FL (ref 80–100)
MONOCYTES # BLD AUTO: 0.52 X10*3/UL (ref 0.1–1)
MONOCYTES NFR BLD AUTO: 7.4 %
NEUTROPHILS # BLD AUTO: 4.46 X10*3/UL (ref 1.2–7.7)
NEUTROPHILS NFR BLD AUTO: 63.2 %
NRBC BLD-RTO: 0 /100 WBCS (ref 0–0)
PLATELET # BLD AUTO: 242 X10*3/UL (ref 150–450)
POTASSIUM SERPL-SCNC: 5.4 MMOL/L (ref 3.4–5.1)
PROT SERPL-MCNC: 7 G/DL (ref 5.9–7.9)
RBC # BLD AUTO: 3.67 X10*6/UL (ref 4–5.2)
SODIUM SERPL-SCNC: 141 MMOL/L (ref 133–145)
TRIGL SERPL-MCNC: 300 MG/DL (ref 40–150)
TSH SERPL DL<=0.05 MIU/L-ACNC: 0.75 MIU/L (ref 0.27–4.2)
WBC # BLD AUTO: 7 X10*3/UL (ref 4.4–11.3)

## 2024-04-24 PROCEDURE — 80053 COMPREHEN METABOLIC PANEL: CPT

## 2024-04-24 PROCEDURE — 84443 ASSAY THYROID STIM HORMONE: CPT

## 2024-04-24 PROCEDURE — 85025 COMPLETE CBC W/AUTO DIFF WBC: CPT

## 2024-04-24 PROCEDURE — 36415 COLL VENOUS BLD VENIPUNCTURE: CPT

## 2024-04-24 PROCEDURE — 80061 LIPID PANEL: CPT

## 2024-04-24 PROCEDURE — 83036 HEMOGLOBIN GLYCOSYLATED A1C: CPT

## 2024-05-31 ENCOUNTER — HOSPITAL ENCOUNTER (INPATIENT)
Facility: HOSPITAL | Age: 69
LOS: 3 days | Discharge: HOME | DRG: 908 | End: 2024-06-03
Attending: INTERNAL MEDICINE | Admitting: INTERNAL MEDICINE
Payer: MEDICARE

## 2024-05-31 ENCOUNTER — HOSPITAL ENCOUNTER (OUTPATIENT)
Dept: RADIOLOGY | Facility: EXTERNAL LOCATION | Age: 69
Discharge: HOME | End: 2024-05-31
Payer: MEDICARE

## 2024-05-31 DIAGNOSIS — L03.031 CELLULITIS AND ABSCESS OF TOE OF RIGHT FOOT: Primary | ICD-10-CM

## 2024-05-31 DIAGNOSIS — N28.9 ABNORMAL RENAL FUNCTION: ICD-10-CM

## 2024-05-31 DIAGNOSIS — M79.5 RETAINED FOREIGN BODY OF FOOT: ICD-10-CM

## 2024-05-31 DIAGNOSIS — M79.89 SWELLING OF RIGHT FOOT: ICD-10-CM

## 2024-05-31 DIAGNOSIS — D50.9 MICROCYTIC ANEMIA: ICD-10-CM

## 2024-05-31 DIAGNOSIS — L02.611 CELLULITIS AND ABSCESS OF TOE OF RIGHT FOOT: Primary | ICD-10-CM

## 2024-05-31 LAB
ALBUMIN SERPL-MCNC: 4.6 G/DL (ref 3.5–5)
ALP BLD-CCNC: 75 U/L (ref 35–125)
ALT SERPL-CCNC: 10 U/L (ref 5–40)
ANION GAP SERPL CALC-SCNC: 13 MMOL/L
AST SERPL-CCNC: 13 U/L (ref 5–40)
BASOPHILS # BLD AUTO: 0.05 X10*3/UL (ref 0–0.1)
BASOPHILS NFR BLD AUTO: 0.4 %
BILIRUB SERPL-MCNC: 0.8 MG/DL (ref 0.1–1.2)
BUN SERPL-MCNC: 15 MG/DL (ref 8–25)
CALCIUM SERPL-MCNC: 9.8 MG/DL (ref 8.5–10.4)
CHLORIDE SERPL-SCNC: 97 MMOL/L (ref 97–107)
CO2 SERPL-SCNC: 24 MMOL/L (ref 24–31)
CREAT SERPL-MCNC: 0.8 MG/DL (ref 0.4–1.6)
EGFRCR SERPLBLD CKD-EPI 2021: 80 ML/MIN/1.73M*2
EOSINOPHIL # BLD AUTO: 0.08 X10*3/UL (ref 0–0.7)
EOSINOPHIL NFR BLD AUTO: 0.6 %
ERYTHROCYTE [DISTWIDTH] IN BLOOD BY AUTOMATED COUNT: 12.3 % (ref 11.5–14.5)
GLUCOSE BLD MANUAL STRIP-MCNC: 179 MG/DL (ref 74–99)
GLUCOSE SERPL-MCNC: 166 MG/DL (ref 65–99)
HCT VFR BLD AUTO: 37.4 % (ref 36–46)
HGB BLD-MCNC: 12.3 G/DL (ref 12–16)
IMM GRANULOCYTES # BLD AUTO: 0.07 X10*3/UL (ref 0–0.7)
IMM GRANULOCYTES NFR BLD AUTO: 0.6 % (ref 0–0.9)
LACTATE BLDV-SCNC: 1.9 MMOL/L (ref 0.4–2)
LYMPHOCYTES # BLD AUTO: 1.68 X10*3/UL (ref 1.2–4.8)
LYMPHOCYTES NFR BLD AUTO: 13.3 %
MCH RBC QN AUTO: 33.9 PG (ref 26–34)
MCHC RBC AUTO-ENTMCNC: 32.9 G/DL (ref 32–36)
MCV RBC AUTO: 103 FL (ref 80–100)
MONOCYTES # BLD AUTO: 1 X10*3/UL (ref 0.1–1)
MONOCYTES NFR BLD AUTO: 7.9 %
NEUTROPHILS # BLD AUTO: 9.72 X10*3/UL (ref 1.2–7.7)
NEUTROPHILS NFR BLD AUTO: 77.2 %
NRBC BLD-RTO: 0 /100 WBCS (ref 0–0)
PLATELET # BLD AUTO: 236 X10*3/UL (ref 150–450)
POTASSIUM SERPL-SCNC: 4.8 MMOL/L (ref 3.4–5.1)
PROT SERPL-MCNC: 8.4 G/DL (ref 5.9–7.9)
RBC # BLD AUTO: 3.63 X10*6/UL (ref 4–5.2)
SODIUM SERPL-SCNC: 134 MMOL/L (ref 133–145)
WBC # BLD AUTO: 12.6 X10*3/UL (ref 4.4–11.3)

## 2024-05-31 PROCEDURE — 87040 BLOOD CULTURE FOR BACTERIA: CPT | Mod: WESLAB | Performed by: NURSE PRACTITIONER

## 2024-05-31 PROCEDURE — G0378 HOSPITAL OBSERVATION PER HR: HCPCS

## 2024-05-31 PROCEDURE — 2500000001 HC RX 250 WO HCPCS SELF ADMINISTERED DRUGS (ALT 637 FOR MEDICARE OP): Performed by: NURSE PRACTITIONER

## 2024-05-31 PROCEDURE — 80053 COMPREHEN METABOLIC PANEL: CPT | Performed by: NURSE PRACTITIONER

## 2024-05-31 PROCEDURE — 85025 COMPLETE CBC W/AUTO DIFF WBC: CPT | Performed by: NURSE PRACTITIONER

## 2024-05-31 PROCEDURE — 0JCQ0ZZ EXTIRPATION OF MATTER FROM RIGHT FOOT SUBCUTANEOUS TISSUE AND FASCIA, OPEN APPROACH: ICD-10-PCS | Performed by: INTERNAL MEDICINE

## 2024-05-31 PROCEDURE — 1210000001 HC SEMI-PRIVATE ROOM DAILY

## 2024-05-31 PROCEDURE — 96367 TX/PROPH/DG ADDL SEQ IV INF: CPT

## 2024-05-31 PROCEDURE — 83605 ASSAY OF LACTIC ACID: CPT | Performed by: NURSE PRACTITIONER

## 2024-05-31 PROCEDURE — 2500000002 HC RX 250 W HCPCS SELF ADMINISTERED DRUGS (ALT 637 FOR MEDICARE OP, ALT 636 FOR OP/ED): Performed by: NURSE PRACTITIONER

## 2024-05-31 PROCEDURE — 99285 EMERGENCY DEPT VISIT HI MDM: CPT | Mod: 25

## 2024-05-31 PROCEDURE — 36415 COLL VENOUS BLD VENIPUNCTURE: CPT | Performed by: NURSE PRACTITIONER

## 2024-05-31 PROCEDURE — 2500000002 HC RX 250 W HCPCS SELF ADMINISTERED DRUGS (ALT 637 FOR MEDICARE OP, ALT 636 FOR OP/ED): Mod: MUE | Performed by: NURSE PRACTITIONER

## 2024-05-31 PROCEDURE — 96365 THER/PROPH/DIAG IV INF INIT: CPT

## 2024-05-31 PROCEDURE — 82947 ASSAY GLUCOSE BLOOD QUANT: CPT

## 2024-05-31 PROCEDURE — 2500000004 HC RX 250 GENERAL PHARMACY W/ HCPCS (ALT 636 FOR OP/ED): Mod: JZ | Performed by: NURSE PRACTITIONER

## 2024-05-31 PROCEDURE — 2500000004 HC RX 250 GENERAL PHARMACY W/ HCPCS (ALT 636 FOR OP/ED): Performed by: NURSE PRACTITIONER

## 2024-05-31 RX ORDER — ASPIRIN 81 MG/1
81 TABLET ORAL DAILY
COMMUNITY

## 2024-05-31 RX ORDER — VALSARTAN 80 MG/1
80 TABLET ORAL DAILY
Status: DISCONTINUED | OUTPATIENT
Start: 2024-06-01 | End: 2024-06-03 | Stop reason: HOSPADM

## 2024-05-31 RX ORDER — FUROSEMIDE 20 MG/1
TABLET ORAL
Status: ON HOLD | COMMUNITY
End: 2024-06-03

## 2024-05-31 RX ORDER — ONDANSETRON 4 MG/1
4 TABLET, FILM COATED ORAL EVERY 8 HOURS PRN
Status: DISCONTINUED | OUTPATIENT
Start: 2024-05-31 | End: 2024-06-03 | Stop reason: HOSPADM

## 2024-05-31 RX ORDER — BISMUTH SUBSALICYLATE 262 MG
1 TABLET,CHEWABLE ORAL DAILY
COMMUNITY

## 2024-05-31 RX ORDER — GABAPENTIN 300 MG/1
300 CAPSULE ORAL NIGHTLY
COMMUNITY

## 2024-05-31 RX ORDER — CHLORTHALIDONE 25 MG/1
25 TABLET ORAL DAILY
Status: DISCONTINUED | OUTPATIENT
Start: 2024-05-31 | End: 2024-06-03 | Stop reason: HOSPADM

## 2024-05-31 RX ORDER — METFORMIN HYDROCHLORIDE 1000 MG/1
1000 TABLET ORAL
Status: DISCONTINUED | OUTPATIENT
Start: 2024-06-01 | End: 2024-06-03 | Stop reason: HOSPADM

## 2024-05-31 RX ORDER — EZETIMIBE 10 MG/1
10 TABLET ORAL DAILY
Status: DISCONTINUED | OUTPATIENT
Start: 2024-05-31 | End: 2024-06-03 | Stop reason: HOSPADM

## 2024-05-31 RX ORDER — CLOPIDOGREL BISULFATE 75 MG/1
75 TABLET ORAL DAILY
COMMUNITY

## 2024-05-31 RX ORDER — METFORMIN HYDROCHLORIDE 1000 MG/1
1000 TABLET ORAL
COMMUNITY

## 2024-05-31 RX ORDER — ONDANSETRON HYDROCHLORIDE 2 MG/ML
4 INJECTION, SOLUTION INTRAVENOUS EVERY 8 HOURS PRN
Status: DISCONTINUED | OUTPATIENT
Start: 2024-05-31 | End: 2024-06-03 | Stop reason: HOSPADM

## 2024-05-31 RX ORDER — CILOSTAZOL 50 MG/1
50 TABLET ORAL 2 TIMES DAILY
Status: DISCONTINUED | OUTPATIENT
Start: 2024-05-31 | End: 2024-06-03 | Stop reason: HOSPADM

## 2024-05-31 RX ORDER — VALSARTAN 160 MG/1
320 TABLET ORAL ONCE
Status: COMPLETED | OUTPATIENT
Start: 2024-05-31 | End: 2024-05-31

## 2024-05-31 RX ORDER — ATENOLOL 50 MG/1
50 TABLET ORAL DAILY
Status: DISCONTINUED | OUTPATIENT
Start: 2024-05-31 | End: 2024-06-03 | Stop reason: HOSPADM

## 2024-05-31 RX ORDER — EZETIMIBE 10 MG/1
10 TABLET ORAL DAILY
COMMUNITY

## 2024-05-31 RX ORDER — ATORVASTATIN CALCIUM 80 MG/1
80 TABLET, FILM COATED ORAL NIGHTLY
Status: DISCONTINUED | OUTPATIENT
Start: 2024-05-31 | End: 2024-06-03 | Stop reason: HOSPADM

## 2024-05-31 RX ORDER — VANCOMYCIN 1 G/200ML
1000 INJECTION, SOLUTION INTRAVENOUS EVERY 12 HOURS
Status: DISCONTINUED | OUTPATIENT
Start: 2024-06-01 | End: 2024-06-01

## 2024-05-31 RX ORDER — HEPARIN SODIUM 5000 [USP'U]/ML
5000 INJECTION, SOLUTION INTRAVENOUS; SUBCUTANEOUS EVERY 8 HOURS SCHEDULED
Status: DISCONTINUED | OUTPATIENT
Start: 2024-05-31 | End: 2024-06-03 | Stop reason: HOSPADM

## 2024-05-31 RX ORDER — PANTOPRAZOLE SODIUM 40 MG/1
40 TABLET, DELAYED RELEASE ORAL
Status: DISCONTINUED | OUTPATIENT
Start: 2024-06-01 | End: 2024-06-03 | Stop reason: HOSPADM

## 2024-05-31 RX ORDER — ACETAMINOPHEN 650 MG/1
650 SUPPOSITORY RECTAL EVERY 4 HOURS PRN
Status: DISCONTINUED | OUTPATIENT
Start: 2024-05-31 | End: 2024-06-03 | Stop reason: HOSPADM

## 2024-05-31 RX ORDER — DEXTROSE 50 % IN WATER (D50W) INTRAVENOUS SYRINGE
25
Status: DISCONTINUED | OUTPATIENT
Start: 2024-05-31 | End: 2024-06-03 | Stop reason: HOSPADM

## 2024-05-31 RX ORDER — AMLODIPINE BESYLATE 10 MG/1
10 TABLET ORAL DAILY
Status: DISCONTINUED | OUTPATIENT
Start: 2024-06-01 | End: 2024-06-03 | Stop reason: HOSPADM

## 2024-05-31 RX ORDER — VANCOMYCIN HYDROCHLORIDE 1 G/20ML
INJECTION, POWDER, LYOPHILIZED, FOR SOLUTION INTRAVENOUS DAILY PRN
Status: DISCONTINUED | OUTPATIENT
Start: 2024-05-31 | End: 2024-05-31

## 2024-05-31 RX ORDER — CLONIDINE HYDROCHLORIDE 0.1 MG/1
0.1 TABLET ORAL ONCE
Status: DISCONTINUED | OUTPATIENT
Start: 2024-05-31 | End: 2024-05-31

## 2024-05-31 RX ORDER — AMLODIPINE BESYLATE 10 MG/1
TABLET ORAL DAILY
COMMUNITY

## 2024-05-31 RX ORDER — OLMESARTAN MEDOXOMIL 20 MG/1
20 TABLET ORAL DAILY
COMMUNITY

## 2024-05-31 RX ORDER — ACETAMINOPHEN 325 MG/1
650 TABLET ORAL EVERY 4 HOURS PRN
Status: DISCONTINUED | OUTPATIENT
Start: 2024-05-31 | End: 2024-06-03 | Stop reason: HOSPADM

## 2024-05-31 RX ORDER — ASPIRIN 81 MG/1
81 TABLET ORAL DAILY
Status: DISCONTINUED | OUTPATIENT
Start: 2024-05-31 | End: 2024-06-03 | Stop reason: HOSPADM

## 2024-05-31 RX ORDER — TALC
3 POWDER (GRAM) TOPICAL NIGHTLY PRN
Status: DISCONTINUED | OUTPATIENT
Start: 2024-05-31 | End: 2024-06-03 | Stop reason: HOSPADM

## 2024-05-31 RX ORDER — CILOSTAZOL 50 MG/1
50 TABLET ORAL 2 TIMES DAILY
COMMUNITY

## 2024-05-31 RX ORDER — VANCOMYCIN 1.75 G/350ML
1250 INJECTION, SOLUTION INTRAVENOUS ONCE
Status: COMPLETED | OUTPATIENT
Start: 2024-05-31 | End: 2024-05-31

## 2024-05-31 RX ORDER — AMLODIPINE BESYLATE 10 MG/1
10 TABLET ORAL ONCE
Status: COMPLETED | OUTPATIENT
Start: 2024-05-31 | End: 2024-05-31

## 2024-05-31 RX ORDER — ATORVASTATIN CALCIUM 80 MG/1
80 TABLET, FILM COATED ORAL DAILY
COMMUNITY

## 2024-05-31 RX ORDER — ACETAMINOPHEN 160 MG/5ML
650 SOLUTION ORAL EVERY 4 HOURS PRN
Status: DISCONTINUED | OUTPATIENT
Start: 2024-05-31 | End: 2024-06-03 | Stop reason: HOSPADM

## 2024-05-31 RX ORDER — DEXTROMETHORPHAN HYDROBROMIDE, GUAIFENESIN 5; 100 MG/5ML; MG/5ML
650 LIQUID ORAL EVERY 8 HOURS PRN
COMMUNITY

## 2024-05-31 RX ORDER — CLOPIDOGREL BISULFATE 75 MG/1
75 TABLET ORAL DAILY
Status: DISCONTINUED | OUTPATIENT
Start: 2024-05-31 | End: 2024-06-03 | Stop reason: HOSPADM

## 2024-05-31 RX ORDER — DEXTROSE 50 % IN WATER (D50W) INTRAVENOUS SYRINGE
12.5
Status: DISCONTINUED | OUTPATIENT
Start: 2024-05-31 | End: 2024-06-03 | Stop reason: HOSPADM

## 2024-05-31 RX ORDER — FUROSEMIDE 20 MG/1
20 TABLET ORAL DAILY
Status: DISCONTINUED | OUTPATIENT
Start: 2024-05-31 | End: 2024-06-03 | Stop reason: HOSPADM

## 2024-05-31 RX ORDER — ATENOLOL AND CHLORTHALIDONE TABLET 50; 25 MG/1; MG/1
1 TABLET ORAL DAILY
COMMUNITY

## 2024-05-31 RX ORDER — VANCOMYCIN HYDROCHLORIDE 1 G/20ML
INJECTION, POWDER, LYOPHILIZED, FOR SOLUTION INTRAVENOUS DAILY PRN
Status: DISCONTINUED | OUTPATIENT
Start: 2024-05-31 | End: 2024-06-03

## 2024-05-31 RX ADMIN — ATENOLOL 50 MG: 50 TABLET ORAL at 20:35

## 2024-05-31 RX ADMIN — HEPARIN SODIUM 5000 UNITS: 5000 INJECTION, SOLUTION INTRAVENOUS; SUBCUTANEOUS at 21:30

## 2024-05-31 RX ADMIN — FUROSEMIDE 20 MG: 20 TABLET ORAL at 20:35

## 2024-05-31 RX ADMIN — AMLODIPINE BESYLATE 10 MG: 10 TABLET ORAL at 13:38

## 2024-05-31 RX ADMIN — PIPERACILLIN SODIUM AND TAZOBACTAM SODIUM 3.38 G: 3; .375 INJECTION, SOLUTION INTRAVENOUS at 20:15

## 2024-05-31 RX ADMIN — VALSARTAN 320 MG: 160 TABLET, FILM COATED ORAL at 14:38

## 2024-05-31 RX ADMIN — ATORVASTATIN CALCIUM 80 MG: 80 TABLET, FILM COATED ORAL at 20:35

## 2024-05-31 RX ADMIN — CHLORTHALIDONE 25 MG: 25 TABLET ORAL at 20:35

## 2024-05-31 RX ADMIN — VANCOMYCIN 1250 MG: 1.75 INJECTION, SOLUTION INTRAVENOUS at 13:38

## 2024-05-31 RX ADMIN — EZETIMIBE 10 MG: 10 TABLET ORAL at 20:35

## 2024-05-31 RX ADMIN — PSYLLIUM HUSK 1 PACKET: 3.4 POWDER ORAL at 20:15

## 2024-05-31 RX ADMIN — PIPERACILLIN SODIUM AND TAZOBACTAM SODIUM 4.5 G: 4; .5 INJECTION, SOLUTION INTRAVENOUS at 13:38

## 2024-05-31 SDOH — SOCIAL STABILITY: SOCIAL INSECURITY: DO YOU FEEL ANYONE HAS EXPLOITED OR TAKEN ADVANTAGE OF YOU FINANCIALLY OR OF YOUR PERSONAL PROPERTY?: NO

## 2024-05-31 SDOH — SOCIAL STABILITY: SOCIAL INSECURITY
WITHIN THE LAST YEAR, HAVE YOU BEEN RAPED OR FORCED TO HAVE ANY KIND OF SEXUAL ACTIVITY BY YOUR PARTNER OR EX-PARTNER?: NO

## 2024-05-31 SDOH — HEALTH STABILITY: MENTAL HEALTH: HOW OFTEN DO YOU HAVE 6 OR MORE DRINKS ON ONE OCCASION?: NEVER

## 2024-05-31 SDOH — SOCIAL STABILITY: SOCIAL INSECURITY: ARE YOU MARRIED, WIDOWED, DIVORCED, SEPARATED, NEVER MARRIED, OR LIVING WITH A PARTNER?: MARRIED

## 2024-05-31 SDOH — HEALTH STABILITY: MENTAL HEALTH
DO YOU FEEL STRESS - TENSE, RESTLESS, NERVOUS, OR ANXIOUS, OR UNABLE TO SLEEP AT NIGHT BECAUSE YOUR MIND IS TROUBLED ALL THE TIME - THESE DAYS?: NOT AT ALL

## 2024-05-31 SDOH — SOCIAL STABILITY: SOCIAL NETWORK
IN A TYPICAL WEEK, HOW MANY TIMES DO YOU TALK ON THE PHONE WITH FAMILY, FRIENDS, OR NEIGHBORS?: MORE THAN THREE TIMES A WEEK

## 2024-05-31 SDOH — SOCIAL STABILITY: SOCIAL NETWORK: HOW OFTEN DO YOU ATTEND MEETINGS OF THE CLUBS OR ORGANIZATIONS YOU BELONG TO?: NEVER

## 2024-05-31 SDOH — SOCIAL STABILITY: SOCIAL NETWORK: HOW OFTEN DO YOU GET TOGETHER WITH FRIENDS OR RELATIVES?: MORE THAN THREE TIMES A WEEK

## 2024-05-31 SDOH — ECONOMIC STABILITY: HOUSING INSECURITY
IN THE LAST 12 MONTHS, WAS THERE A TIME WHEN YOU DID NOT HAVE A STEADY PLACE TO SLEEP OR SLEPT IN A SHELTER (INCLUDING NOW)?: NO

## 2024-05-31 SDOH — SOCIAL STABILITY: SOCIAL INSECURITY: WITHIN THE LAST YEAR, HAVE YOU BEEN AFRAID OF YOUR PARTNER OR EX-PARTNER?: NO

## 2024-05-31 SDOH — SOCIAL STABILITY: SOCIAL INSECURITY: WITHIN THE LAST YEAR, HAVE YOU BEEN HUMILIATED OR EMOTIONALLY ABUSED IN OTHER WAYS BY YOUR PARTNER OR EX-PARTNER?: NO

## 2024-05-31 SDOH — ECONOMIC STABILITY: HOUSING INSECURITY: IN THE LAST 12 MONTHS, HOW MANY PLACES HAVE YOU LIVED?: 1

## 2024-05-31 SDOH — ECONOMIC STABILITY: FOOD INSECURITY: WITHIN THE PAST 12 MONTHS, YOU WORRIED THAT YOUR FOOD WOULD RUN OUT BEFORE YOU GOT THE MONEY TO BUY MORE.: NEVER TRUE

## 2024-05-31 SDOH — ECONOMIC STABILITY: TRANSPORTATION INSECURITY
IN THE PAST 12 MONTHS, HAS LACK OF TRANSPORTATION KEPT YOU FROM MEETINGS, WORK, OR FROM GETTING THINGS NEEDED FOR DAILY LIVING?: NO

## 2024-05-31 SDOH — HEALTH STABILITY: MENTAL HEALTH: HOW OFTEN DO YOU HAVE SIX OR MORE DRINKS ON ONE OCCASION?: NEVER

## 2024-05-31 SDOH — SOCIAL STABILITY: SOCIAL INSECURITY
WITHIN THE LAST YEAR, HAVE YOU BEEN KICKED, HIT, SLAPPED, OR OTHERWISE PHYSICALLY HURT BY YOUR PARTNER OR EX-PARTNER?: NO

## 2024-05-31 SDOH — HEALTH STABILITY: MENTAL HEALTH: HOW MANY STANDARD DRINKS CONTAINING ALCOHOL DO YOU HAVE ON A TYPICAL DAY?: 1 OR 2

## 2024-05-31 SDOH — HEALTH STABILITY: MENTAL HEALTH: HOW MANY DRINKS CONTAINING ALCOHOL DO YOU HAVE ON A TYPICAL DAY WHEN YOU ARE DRINKING?: 1 OR 2

## 2024-05-31 SDOH — HEALTH STABILITY: MENTAL HEALTH: HOW OFTEN DO YOU HAVE A DRINK CONTAINING ALCOHOL?: 2-4 TIMES A MONTH

## 2024-05-31 SDOH — ECONOMIC STABILITY: INCOME INSECURITY: IN THE PAST 12 MONTHS, HAS THE ELECTRIC, GAS, OIL, OR WATER COMPANY THREATENED TO SHUT OFF SERVICE IN YOUR HOME?: NO

## 2024-05-31 SDOH — SOCIAL STABILITY: SOCIAL INSECURITY
WITHIN THE LAST YEAR, HAVE TO BEEN RAPED OR FORCED TO HAVE ANY KIND OF SEXUAL ACTIVITY BY YOUR PARTNER OR EX-PARTNER?: NO

## 2024-05-31 SDOH — HEALTH STABILITY: PHYSICAL HEALTH
HOW OFTEN DO YOU NEED TO HAVE SOMEONE HELP YOU WHEN YOU READ INSTRUCTIONS, PAMPHLETS, OR OTHER WRITTEN MATERIAL FROM YOUR DOCTOR OR PHARMACY?: NEVER

## 2024-05-31 SDOH — HEALTH STABILITY: PHYSICAL HEALTH: ON AVERAGE, HOW MANY MINUTES DO YOU ENGAGE IN EXERCISE AT THIS LEVEL?: 0 MIN

## 2024-05-31 SDOH — ECONOMIC STABILITY: INCOME INSECURITY: IN THE LAST 12 MONTHS, WAS THERE A TIME WHEN YOU WERE NOT ABLE TO PAY THE MORTGAGE OR RENT ON TIME?: NO

## 2024-05-31 SDOH — SOCIAL STABILITY: SOCIAL NETWORK: HOW OFTEN DO YOU ATTEND CHURCH OR RELIGIOUS SERVICES?: MORE THAN 4 TIMES PER YEAR

## 2024-05-31 SDOH — ECONOMIC STABILITY: HOUSING INSECURITY: IN THE LAST 12 MONTHS, WAS THERE A TIME WHEN YOU WERE NOT ABLE TO PAY THE MORTGAGE OR RENT ON TIME?: NO

## 2024-05-31 SDOH — SOCIAL STABILITY: SOCIAL NETWORK
DO YOU BELONG TO ANY CLUBS OR ORGANIZATIONS SUCH AS CHURCH GROUPS, UNIONS, FRATERNAL OR ATHLETIC GROUPS, OR SCHOOL GROUPS?: NO

## 2024-05-31 SDOH — ECONOMIC STABILITY: FOOD INSECURITY: WITHIN THE PAST 12 MONTHS, YOU WORRIED THAT YOUR FOOD WOULD RUN OUT BEFORE YOU GOT MONEY TO BUY MORE.: NEVER TRUE

## 2024-05-31 SDOH — ECONOMIC STABILITY: FOOD INSECURITY: WITHIN THE PAST 12 MONTHS, THE FOOD YOU BOUGHT JUST DIDN'T LAST AND YOU DIDN'T HAVE MONEY TO GET MORE.: NEVER TRUE

## 2024-05-31 SDOH — HEALTH STABILITY: PHYSICAL HEALTH: ON AVERAGE, HOW MANY DAYS PER WEEK DO YOU ENGAGE IN MODERATE TO STRENUOUS EXERCISE (LIKE A BRISK WALK)?: 0 DAYS

## 2024-05-31 SDOH — SOCIAL STABILITY: SOCIAL NETWORK: ARE YOU MARRIED, WIDOWED, DIVORCED, SEPARATED, NEVER MARRIED, OR LIVING WITH A PARTNER?: MARRIED

## 2024-05-31 SDOH — SOCIAL STABILITY: SOCIAL INSECURITY: ARE YOU OR HAVE YOU BEEN THREATENED OR ABUSED PHYSICALLY, EMOTIONALLY, OR SEXUALLY BY ANYONE?: NO

## 2024-05-31 SDOH — ECONOMIC STABILITY: FOOD INSECURITY: HOW HARD IS IT FOR YOU TO PAY FOR THE VERY BASICS LIKE FOOD, HOUSING, MEDICAL CARE, AND HEATING?: NOT HARD AT ALL

## 2024-05-31 SDOH — ECONOMIC STABILITY: TRANSPORTATION INSECURITY: IN THE PAST 12 MONTHS, HAS LACK OF TRANSPORTATION KEPT YOU FROM MEDICAL APPOINTMENTS OR FROM GETTING MEDICATIONS?: NO

## 2024-05-31 SDOH — SOCIAL STABILITY: SOCIAL INSECURITY: HAVE YOU HAD THOUGHTS OF HARMING ANYONE ELSE?: NO

## 2024-05-31 SDOH — ECONOMIC STABILITY: INCOME INSECURITY: HOW HARD IS IT FOR YOU TO PAY FOR THE VERY BASICS LIKE FOOD, HOUSING, MEDICAL CARE, AND HEATING?: NOT HARD AT ALL

## 2024-05-31 SDOH — SOCIAL STABILITY: SOCIAL NETWORK
DO YOU BELONG TO ANY CLUBS OR ORGANIZATIONS SUCH AS CHURCH GROUPS UNIONS, FRATERNAL OR ATHLETIC GROUPS, OR SCHOOL GROUPS?: NO

## 2024-05-31 SDOH — SOCIAL STABILITY: SOCIAL INSECURITY: ABUSE: ADULT

## 2024-05-31 SDOH — SOCIAL STABILITY: SOCIAL INSECURITY: HAVE YOU HAD ANY THOUGHTS OF HARMING ANYONE ELSE?: NO

## 2024-05-31 SDOH — HEALTH STABILITY: MENTAL HEALTH
STRESS IS WHEN SOMEONE FEELS TENSE, NERVOUS, ANXIOUS, OR CAN'T SLEEP AT NIGHT BECAUSE THEIR MIND IS TROUBLED. HOW STRESSED ARE YOU?: NOT AT ALL

## 2024-05-31 SDOH — ECONOMIC STABILITY: INCOME INSECURITY: IN THE PAST 12 MONTHS HAS THE ELECTRIC, GAS, OIL, OR WATER COMPANY THREATENED TO SHUT OFF SERVICES IN YOUR HOME?: NO

## 2024-05-31 SDOH — SOCIAL STABILITY: SOCIAL INSECURITY: HAS ANYONE EVER THREATENED TO HURT YOUR FAMILY OR YOUR PETS?: NO

## 2024-05-31 SDOH — SOCIAL STABILITY: SOCIAL INSECURITY: WERE YOU ABLE TO COMPLETE ALL THE BEHAVIORAL HEALTH SCREENINGS?: YES

## 2024-05-31 SDOH — ECONOMIC STABILITY: TRANSPORTATION INSECURITY
IN THE PAST 12 MONTHS, HAS THE LACK OF TRANSPORTATION KEPT YOU FROM MEDICAL APPOINTMENTS OR FROM GETTING MEDICATIONS?: NO

## 2024-05-31 SDOH — SOCIAL STABILITY: SOCIAL NETWORK: HOW OFTEN DO YOU ATTENT MEETINGS OF THE CLUB OR ORGANIZATION YOU BELONG TO?: NEVER

## 2024-05-31 SDOH — SOCIAL STABILITY: SOCIAL INSECURITY: DO YOU FEEL UNSAFE GOING BACK TO THE PLACE WHERE YOU ARE LIVING?: NO

## 2024-05-31 SDOH — SOCIAL STABILITY: SOCIAL INSECURITY: ARE THERE ANY APPARENT SIGNS OF INJURIES/BEHAVIORS THAT COULD BE RELATED TO ABUSE/NEGLECT?: NO

## 2024-05-31 SDOH — SOCIAL STABILITY: SOCIAL INSECURITY: DOES ANYONE TRY TO KEEP YOU FROM HAVING/CONTACTING OTHER FRIENDS OR DOING THINGS OUTSIDE YOUR HOME?: NO

## 2024-05-31 ASSESSMENT — LIFESTYLE VARIABLES
AUDIT-C TOTAL SCORE: 3
AUDIT TOTAL SCORE: 3
HOW OFTEN DO YOU HAVE 6 OR MORE DRINKS ON ONE OCCASION: LESS THAN MONTHLY
HOW OFTEN DURING THE LAST YEAR HAVE YOU HAD A FEELING OF GUILT OR REMORSE AFTER DRINKING: NEVER
SKIP TO QUESTIONS 9-10: 1
PRESCIPTION_ABUSE_PAST_12_MONTHS: NO
HOW MANY STANDARD DRINKS CONTAINING ALCOHOL DO YOU HAVE ON A TYPICAL DAY: 3 OR 4
HOW OFTEN DURING THE LAST YEAR HAVE YOU BEEN UNABLE TO REMEMBER WHAT HAPPENED THE NIGHT BEFORE BECAUSE YOU HAD BEEN DRINKING: NEVER
HOW OFTEN DURING THE LAST YEAR HAVE YOU NEEDED AN ALCOHOLIC DRINK FIRST THING IN THE MORNING TO GET YOURSELF GOING AFTER A NIGHT OF HEAVY DRINKING: NEVER
AUDIT TOTAL SCORE: 0
SUBSTANCE_ABUSE_PAST_12_MONTHS: NO
HOW OFTEN DURING THE LAST YEAR HAVE YOU FAILED TO DO WHAT WAS NORMALLY EXPECTED FROM YOU BECAUSE OF DRINKING: NEVER
SKIP TO QUESTIONS 9-10: 0
SKIP TO QUESTIONS 9-10: 1
AUDIT-C TOTAL SCORE: 2
HOW OFTEN DURING THE LAST YEAR HAVE YOU FOUND THAT YOU WERE NOT ABLE TO STOP DRINKING ONCE YOU HAD STARTED: NEVER
AUDIT-C TOTAL SCORE: 2
HOW OFTEN DO YOU HAVE A DRINK CONTAINING ALCOHOL: MONTHLY OR LESS
HAVE YOU OR SOMEONE ELSE BEEN INJURED AS A RESULT OF YOUR DRINKING: NO
HAS A RELATIVE, FRIEND, DOCTOR, OR ANOTHER HEALTH PROFESSIONAL EXPRESSED CONCERN ABOUT YOUR DRINKING OR SUGGESTED YOU CUT DOWN: NO
AUDIT-C TOTAL SCORE: 3

## 2024-05-31 ASSESSMENT — COGNITIVE AND FUNCTIONAL STATUS - GENERAL
PATIENT BASELINE BEDBOUND: NO
MOVING TO AND FROM BED TO CHAIR: A LITTLE
MOBILITY SCORE: 20
MOBILITY SCORE: 24
CLIMB 3 TO 5 STEPS WITH RAILING: A LITTLE
DAILY ACTIVITIY SCORE: 23
WALKING IN HOSPITAL ROOM: A LITTLE
STANDING UP FROM CHAIR USING ARMS: A LITTLE
DAILY ACTIVITIY SCORE: 24
TOILETING: A LITTLE

## 2024-05-31 ASSESSMENT — ACTIVITIES OF DAILY LIVING (ADL)
FEEDING YOURSELF: INDEPENDENT
WALKS IN HOME: INDEPENDENT
LACK_OF_TRANSPORTATION: NO
JUDGMENT_ADEQUATE_SAFELY_COMPLETE_DAILY_ACTIVITIES: YES
ADEQUATE_TO_COMPLETE_ADL: YES
HEARING - LEFT EAR: FUNCTIONAL
GROOMING: INDEPENDENT
BATHING: INDEPENDENT
TOILETING: INDEPENDENT
HEARING - RIGHT EAR: FUNCTIONAL
DRESSING YOURSELF: INDEPENDENT
LACK_OF_TRANSPORTATION: NO
LACK_OF_TRANSPORTATION: NO
PATIENT'S MEMORY ADEQUATE TO SAFELY COMPLETE DAILY ACTIVITIES?: YES
ASSISTIVE_DEVICE: EYEGLASSES
LACK_OF_TRANSPORTATION: NO

## 2024-05-31 ASSESSMENT — ENCOUNTER SYMPTOMS
GASTROINTESTINAL NEGATIVE: 1
ENDOCRINE NEGATIVE: 1
EYES NEGATIVE: 1
MUSCULOSKELETAL NEGATIVE: 1
CONSTITUTIONAL NEGATIVE: 1
HEMATOLOGIC/LYMPHATIC NEGATIVE: 1
WOUND: 1
RESPIRATORY NEGATIVE: 1
ALLERGIC/IMMUNOLOGIC NEGATIVE: 1
NEUROLOGICAL NEGATIVE: 1
CARDIOVASCULAR NEGATIVE: 1
PSYCHIATRIC NEGATIVE: 1

## 2024-05-31 ASSESSMENT — PATIENT HEALTH QUESTIONNAIRE - PHQ9
SUM OF ALL RESPONSES TO PHQ9 QUESTIONS 1 & 2: 0
2. FEELING DOWN, DEPRESSED OR HOPELESS: NOT AT ALL
1. LITTLE INTEREST OR PLEASURE IN DOING THINGS: NOT AT ALL

## 2024-05-31 ASSESSMENT — PAIN - FUNCTIONAL ASSESSMENT: PAIN_FUNCTIONAL_ASSESSMENT: 0-10

## 2024-05-31 ASSESSMENT — COLUMBIA-SUICIDE SEVERITY RATING SCALE - C-SSRS
2. HAVE YOU ACTUALLY HAD ANY THOUGHTS OF KILLING YOURSELF?: NO
6. HAVE YOU EVER DONE ANYTHING, STARTED TO DO ANYTHING, OR PREPARED TO DO ANYTHING TO END YOUR LIFE?: NO
1. IN THE PAST MONTH, HAVE YOU WISHED YOU WERE DEAD OR WISHED YOU COULD GO TO SLEEP AND NOT WAKE UP?: NO

## 2024-05-31 ASSESSMENT — PAIN SCALES - GENERAL: PAINLEVEL_OUTOF10: 0 - NO PAIN

## 2024-05-31 NOTE — PROGRESS NOTES
05/31/24 1816   Current Planned Discharge Disposition   Current Planned Discharge Disposition Home

## 2024-05-31 NOTE — CARE PLAN
Pt has a POA and Living will not on file  ADOD: 3 days    Pt lives at home with her  in a 1 floor home with 2 steps to climb to enter the home  Pt does not use home 02/cpap or bipap. She does not use any assistive device when ambulating.  She is independent wit ADL's. Able to shop, drive, cook, clean  She wears glasses, no hearing aids.  She denies hx of depression and anxiety  Her PCP is Dr. Kirkland and she uses [facility] on [facility] for her meds and she said that she can afford them  She is here for a right foot wound--hx of diabetes    DISCHARGE PLAN: TBD; UNKNOWN AT THIS TIME IF PT WILL NEED HHC

## 2024-05-31 NOTE — PROGRESS NOTES
05/31/24 1815   [facility] Disability Status   Are you deaf or do you have serious difficulty hearing? N   Are you blind or do you have serious difficulty seeing, even when wearing glasses? N   Because of a physical, mental, or emotional condition, do you have serious difficulty concentrating, remembering, or making decisions? (5 years old or older) N   Do you have serious difficulty walking or climbing stairs? N   Do you have serious difficulty dressing or bathing? N   Because of a physical, mental, or emotional condition, do you have serious difficulty doing errands alone such as visiting the doctor? N

## 2024-05-31 NOTE — H&P
Chief Complaint Right foot pain, swelling, redness - foreign body    History Of Present Illness  Lottie Rodríguez is a very pleasant 68 y.o. obese  female presenting with right foot pain, swelling and redness.  She informs me that she was in her usual state of health until today.  2 days ago, she accidentally dropped a glass, which shattered on the floor.  She cleaned the glass up from the floor.  Today, she developed right foot pain, swelling and redness.  She had trouble walking on the foot due to the pain.  She had diabetes mellitus and reports neuropathy symptoms in her feet.  She does not recall stepping on any glass or foreign body though now wonders if she has glass in her foot.  She denies any symptoms of subjective fevers chills or sweats.  She has chronic arthritis for which she takes over the counter analgesics.  She took the analgesics without relief of pain.  She presented to the urgent care today.  X-ray imaging showed a foreign body and she was sent to the emergency department.  In the emergency department, initial work-up was done.  Urgent care x-ray imaging per radiology shows a radiopaque foreign body within the plantar subcutaneous soft tissues at the level of the metatarsal head.  She was hypertensive with a systolic blood pressure > 200.  She did not take her daily medications.  Lab data CMP showed a glucose of 166.  BUN 15.  Creatinine 0.8.  CBC showed a WBC of 12.6.  Lactic acid 1.9.  Blood cultures x 2 were obtained.  She was treated in the emergency department with IV Zosyn (which she tolerated) and IV Vancomycin and was admitted.  At the time of my evaluation, she is resting on the cart in the ED in no acute distress.  She is awake alert oriented x 3.  She complains of right foot pain worse with movement and touch.  Pain is controlled at rest, 2/10.  She denies any subjective fevers chills or sweats.  I spoke with her ED bedside nurse who informs me her blood pressure is elevated.   She denies any symptoms.  On review of the record, she received Amlodipine and Valsartan.       Past Medical History  Significant for hypertension, hyperlipidemia, renal artery stenosis, atherosclerosis of artery, peripheral vascular disease, history of tobacco use, type 2 diabetes mellitus, neuropathy, history of breast cancer status post partial mastectomy, psoriasis    Surgical History  Significant for right trigger finger middle ([place] surgery Dr. Faye), hysterectomy, right breast biopsy 2014, right breast partial mastectomy     Social History    Lives with spouse, family  Independent, ambulatory  History of tobacco use  Social alcohol use, wine  No drug use    PCP = Dr. Kirkland    Family History  Record reviewed  Mother: cancer - lung, hypertension  Father: no known problems  Sister: COPD  Brother: cancer - COVID-19  Maternal grand-mother: breast cancer  Maternal grand-father: heart attack      Allergies  Penicillins    Review of Systems   Constitutional: Negative.    HENT: Negative.     Eyes: Negative.    Respiratory: Negative.     Cardiovascular: Negative.    Gastrointestinal: Negative.    Endocrine: Negative.    Genitourinary: Negative.    Musculoskeletal: Negative.    Skin:  Positive for wound.   Allergic/Immunologic: Negative.    Neurological: Negative.    Hematological: Negative.    Psychiatric/Behavioral: Negative.     All other systems reviewed and are negative.      Physical Exam  Vitals and nursing note reviewed.   Constitutional:       General: She is not in acute distress.     Appearance: Normal appearance. She is obese. She is not ill-appearing, toxic-appearing or diaphoretic.   HENT:      Head: Normocephalic and atraumatic.      Right Ear: Tympanic membrane normal.      Left Ear: Tympanic membrane normal.      Nose: Nose normal.      Mouth/Throat:      Mouth: Mucous membranes are moist.      Pharynx: Oropharynx is clear.   Eyes:      Extraocular Movements: Extraocular movements  "intact.      Conjunctiva/sclera: Conjunctivae normal.      Pupils: Pupils are equal, round, and reactive to light.   Cardiovascular:      Rate and Rhythm: Normal rate and regular rhythm.      Pulses: Normal pulses.      Heart sounds: Normal heart sounds. No murmur heard.  Pulmonary:      Effort: Pulmonary effort is normal. No respiratory distress.      Breath sounds: Normal breath sounds. No wheezing, rhonchi or rales.   Abdominal:      General: Bowel sounds are normal. There is no distension.      Palpations: Abdomen is soft.      Tenderness: There is no abdominal tenderness.   Genitourinary:     Comments: Deferred.  Musculoskeletal:         General: No swelling or tenderness. Normal range of motion.      Cervical back: Normal range of motion and neck supple.   Skin:     General: Skin is warm and dry.      Capillary Refill: Capillary refill takes less than 2 seconds.      Findings: Erythema and rash present.      Comments: Right foot plantar 1st metatarsal small open area - no drainage + diffuse erythema, warmth, tenderness. + Psoriatic lesions.   Neurological:      General: No focal deficit present.      Mental Status: She is alert and oriented to person, place, and time.   Psychiatric:         Mood and Affect: Mood normal.         Behavior: Behavior normal.       Last Recorded Vitals  Blood pressure 164/56, pulse 69, temperature 36.5 °C (97.7 °F), temperature source Temporal, resp. rate 18, height 1.676 m (5' 6\"), weight 86.2 kg (190 lb), SpO2 96%.    Relevant Results  Results for orders placed or performed during the hospital encounter of 05/31/24 (from the past 24 hour(s))   CBC and Auto Differential   Result Value Ref Range    WBC 12.6 (H) 4.4 - 11.3 x10*3/uL    nRBC 0.0 0.0 - 0.0 /100 WBCs    RBC 3.63 (L) 4.00 - 5.20 x10*6/uL    Hemoglobin 12.3 12.0 - 16.0 g/dL    Hematocrit 37.4 36.0 - 46.0 %     (H) 80 - 100 fL    MCH 33.9 26.0 - 34.0 pg    MCHC 32.9 32.0 - 36.0 g/dL    RDW 12.3 11.5 - 14.5 %    " Platelets 236 150 - 450 x10*3/uL    Neutrophils % 77.2 40.0 - 80.0 %    Immature Granulocytes %, Automated 0.6 0.0 - 0.9 %    Lymphocytes % 13.3 13.0 - 44.0 %    Monocytes % 7.9 2.0 - 10.0 %    Eosinophils % 0.6 0.0 - 6.0 %    Basophils % 0.4 0.0 - 2.0 %    Neutrophils Absolute 9.72 (H) 1.20 - 7.70 x10*3/uL    Immature Granulocytes Absolute, Automated 0.07 0.00 - 0.70 x10*3/uL    Lymphocytes Absolute 1.68 1.20 - 4.80 x10*3/uL    Monocytes Absolute 1.00 0.10 - 1.00 x10*3/uL    Eosinophils Absolute 0.08 0.00 - 0.70 x10*3/uL    Basophils Absolute 0.05 0.00 - 0.10 x10*3/uL   Comprehensive Metabolic Panel   Result Value Ref Range    Glucose 166 (H) 65 - 99 mg/dL    Sodium 134 133 - 145 mmol/L    Potassium 4.8 3.4 - 5.1 mmol/L    Chloride 97 97 - 107 mmol/L    Bicarbonate 24 24 - 31 mmol/L    Urea Nitrogen 15 8 - 25 mg/dL    Creatinine 0.80 0.40 - 1.60 mg/dL    eGFR 80 >60 mL/min/1.73m*2    Calcium 9.8 8.5 - 10.4 mg/dL    Albumin 4.6 3.5 - 5.0 g/dL    Alkaline Phosphatase 75 35 - 125 U/L    Total Protein 8.4 (H) 5.9 - 7.9 g/dL    AST 13 5 - 40 U/L    Bilirubin, Total 0.8 0.1 - 1.2 mg/dL    ALT 10 5 - 40 U/L    Anion Gap 13 <=19 mmol/L   Blood Culture    Specimen: Peripheral Venipuncture; Blood culture   Result Value Ref Range    Blood Culture Loaded on Instrument - Culture in progress    Blood Culture    Specimen: Peripheral Venipuncture; Blood culture   Result Value Ref Range    Blood Culture Loaded on Instrument - Culture in progress    Blood Gas Lactic Acid, Venous   Result Value Ref Range    POCT Lactate, Venous 1.9 0.4 - 2.0 mmol/L     No results found for the last 90 days.    Urgent Care Xray    Result Date: 5/31/2024  Interpreted By:  Luis Beard, STUDY: XR URGENT CARE XRAY; ;  5/31/2024 11:43 am   INDICATION: Signs/Symptoms:Patient stepped on glass x 2 days ago, rule out foreign body. .   COMPARISON: None.   ACCESSION NUMBER(S): [id number]   ORDERING CLINICIAN: EVELYNE ALFORD   FINDINGS: Three views of the  right foot.   A linear radiodensity projects over the plantar soft tissues at the level of the 1st metatarsal head. Plantar and dorsal calcaneal enthesophytes. No acute fracture. No dislocation.       Radiopaque foreign body within the plantar subcutaneous soft tissues at the level of the 1st metatarsal head.   MACRO: None   Signed by: Luis Beard 5/31/2024 12:04 PM Dictation workstation:   [id number]     Scheduled medications  heparin (porcine), 5,000 Units, subcutaneous, q8h LAURO  piperacillin-tazobactam, 3.375 g, intravenous, q6h  psyllium, 1 packet, oral, Daily  [START ON 6/1/2024] vancomycin, 1,000 mg, intravenous, q12h      Continuous medications     PRN medications  PRN medications: acetaminophen **OR** acetaminophen **OR** acetaminophen, dextrose, dextrose, glucagon, glucagon, melatonin, ondansetron **OR** ondansetron, vancomycin      ASSESSMENT:  Right foot foreign body  Right foot cellulitis  Leukocytosis  Hypertensive urgency  Hypertension  Renal artery stenosis  Hyperlipidemia  Peripheral vascular disease  History of tobacco use  Type 2 diabetes mellitus  Neuropathy  History of breast cancer  Psoriasis    PLAN:  Podiatry and Infectious disease consultations. Broad-spectrum IV antibiotics.  IV Zosyn (tolerated in the ED) + Vancomycin - pharmacy dosing.  Monitor Vancomycin level and renal function.  Pain control. Activity: Non weight bearing right lower extremity.  Repeat blood pressure improved.  Clonidine x 1 initially ordered though repeat blood pressure improved - Continue home medications.  Monitor blood pressure and treat with antihypertensives as appropriate.  Hemoglobin A1c 6.8 4/24/2024.  Monitor point of care glucose AC/HS.  Metformin.  Add insulin if needed for glycemic control.  ADA diet.  PT/OT evaluation.  Fall precautions.  Up with assistance only.  Bed and chair alarm at all times.  Home medications reviewed.  Continue home medications as prescribed as appropriate.  Supportive care.   Patient reassured.  She states she has a living will and durable health care power of : Spouse.  Discussed code status.  She wishes to be a FULL CODE.  Case management consultation for discharge planning.  We will take DVT, fall, aspiration and decubitus precautions during her stay in the hospital.  Orders written per Dr. Lang.  Any additions or modifications at his discretion.      Humaira Malave, APRN-CNP

## 2024-05-31 NOTE — CONSULTS
Vancomycin Dosing by Pharmacy- INITIAL    Lottie Rodríguez is a 68 y.o. year old female who Pharmacy has been consulted for vancomycin dosing for cellulitis, skin and soft tissue. Based on the patient's indication and renal status this patient will be dosed based on a goal AUC of 400-600.     Renal function is currently stable.    Visit Vitals  /56 (BP Location: Left arm, Patient Position: Lying)   Pulse 69   Temp 36.5 °C (97.7 °F) (Temporal)   Resp 18        Lab Results   Component Value Date    CREATININE 0.80 2024    CREATININE 0.80 2024    CREATININE 1.40 2024    CREATININE 1.20 2024        Patient weight is as follows:   Vitals:    24 1253   Weight: 86.2 kg (190 lb)       Cultures:  No results found for the encounter in last 14 days.        No intake/output data recorded.  I/O during current shift:  No intake/output data recorded.    Temp (24hrs), Av.5 °C (97.7 °F), Min:36.5 °C (97.7 °F), Max:36.5 °C (97.7 °F)         Assessment/Plan     Patient has already been given a loading dose of 1250 mg.  Will initiate vancomycin maintenance,  1000 mg every 12 hours.    This dosing regimen is predicted by [employer] to result in the following pharmacokinetic parameters:    Regimen: 1000 mg IV every 12 hours.  Start time: 01:38 on 2024  Exposure target: AUC24 (range)400-600 mg/L.hr   AUC24,ss: 543 mg/L.hr  Probability of AUC24 > 400: 81 %  Ctrough,ss: 17.8 mg/L  Probability of Ctrough,ss > 20: 39 %  Probability of nephrotoxicity (Lodise VIDYA ): 14 %    Follow-up level will be ordered on  at 0500 unless clinically indicated sooner.  Will continue to monitor renal function daily while on vancomycin and order serum creatinine at least every 48 hours if not already ordered.  Follow for continued vancomycin needs, clinical response, and signs/symptoms of toxicity.       Alex Hurd [facility]

## 2024-05-31 NOTE — CONSULTS
Vancomycin Dosing by Pharmacy- EMERGENCY DEPARTMENT    Lottie Rodríguez is a 68 y.o. year old female who Pharmacy has been consulted to give a ONE TIME ONLY vancomycin dose in the Emergency Department for cellulitis, skin and soft tissue.     Visit Vitals  BP (!) 226/62 (BP Location: Left arm, Patient Position: Sitting)   Pulse 89   Temp 36.5 °C (97.7 °F) (Temporal)   Resp 16        Lab Results   Component Value Date    CREATININE 0.80 04/24/2024    CREATININE 1.40 02/06/2024    CREATININE 1.20 01/25/2024    CREATININE 0.80 11/14/2023        Patient weight is   Wt Readings from Last 1 Encounters:   05/31/24 86.2 kg (190 lb)        Assessment/Plan     Patient will be given a one time dose of 1250 mg  Pharmacy will sign off at this time. If vancomycin is to be continued, please re-consult Pharmacy.       Katheryn Noguera, PharmD

## 2024-05-31 NOTE — PROGRESS NOTES
Pharmacy Medication History Review    Lottie Rodríguez is a 68 y.o. female admitted. Pharmacy reviewed the patient's ggevl-og-biyfkobye medications and allergies for accuracy.    Medications ADDED:  ALL  Medications CHANGED:  none  Medications REMOVED:   none     The list below reflects the updated PTA list. Comments regarding how patient may be taking medications differently can be found in the Admit Orders Activity  Prior to Admission Medications   Prescriptions Last Dose Informant   acetaminophen (Tylenol 8 HOUR) 650 mg ER tablet Past Month Self   Sig: Take 1 tablet (650 mg) by mouth every 8 hours if needed for mild pain (1 - 3). Do not crush, chew, or split.   amLODIPine (Norvasc) 10 mg tablet 5/30/2024 Self   Sig: Take by mouth once daily.   aspirin 81 mg EC tablet 5/30/2024 Self   Sig: Take 1 tablet (81 mg) by mouth once daily.   atenoloL-chlorthalidone (Tenoretic) 50-25 mg tablet 5/30/2024 Self   Sig: Take 1 tablet by mouth once daily.   atorvastatin (Lipitor) 80 mg tablet 5/30/2024 Self   Sig: Take 1 tablet (80 mg) by mouth once daily.   cilostazol (Pletal) 50 mg tablet 5/30/2024 Self   Sig: Take 1 tablet (50 mg) by mouth 2 times a day.   clopidogrel (Plavix) 75 mg tablet 5/30/2024 Self   Sig: Take 1 tablet (75 mg) by mouth once daily.   ezetimibe (Zetia) 10 mg tablet 5/30/2024 Self   Sig: Take 1 tablet (10 mg) by mouth once daily.   furosemide (Lasix) 20 mg tablet 5/30/2024 Self   Sig: Take by mouth.   gabapentin (Neurontin) 300 mg capsule  Self   Sig: Take 1 capsule (300 mg) by mouth once daily at bedtime.   metFORMIN (Glucophage) 1,000 mg tablet 5/30/2024 Self   Sig: Take 1 tablet (1,000 mg) by mouth 2 times daily (morning and late afternoon).   multivitamin tablet  Self   Sig: Take 1 tablet by mouth once daily.   olmesartan (BENIcar) 20 mg tablet 5/30/2024 Self   Sig: Take 1 tablet (20 mg) by mouth once daily.      Facility-Administered Medications: None        The list below reflects the updated allergy  list. Please review each documented allergy for additional clarification and justification.  Allergies  Reviewed by Amanda Butler on 5/31/2024        Severity Reactions Comments    Penicillins Medium Hives, Rash, Swelling, Unknown             Pharmacy has been updated to  [facility].    Sources used to complete the med history include historical AVS, patient interview. Patient is a good historian.    Below are additional concerns with the patient's PTA list.  Orders made for losartan and given before I was able to finish the list.     Amanda Butler  Please reach out via [employer] Secure Chat for questions

## 2024-05-31 NOTE — PROGRESS NOTES
05/31/24 1753   Physical Activity   On average, how many days per week do you engage in moderate to strenuous exercise (like a brisk walk)? 0 days   On average, how many minutes do you engage in exercise at this level? 0 min   Financial Resource Strain   How hard is it for you to pay for the very basics like food, housing, medical care, and heating? Not hard   Housing Stability   In the last 12 months, was there a time when you were not able to pay the mortgage or rent on time? N   In the last 12 months, how many places have you lived? 1   In the last 12 months, was there a time when you did not have a steady place to sleep or slept in a shelter (including now)? N   Transportation Needs   In the past 12 months, has lack of transportation kept you from medical appointments or from getting medications? no   In the past 12 months, has lack of transportation kept you from meetings, work, or from getting things needed for daily living? No   Food Insecurity   Within the past 12 months, you worried that your food would run out before you got the money to buy more. Never true   Within the past 12 months, the food you bought just didn't last and you didn't have money to get more. Never true   Stress   Do you feel stress - tense, restless, nervous, or anxious, or unable to sleep at night because your mind is troubled all the time - these days? Not at all   Social Connections   In a typical week, how many times do you talk on the phone with family, friends, or neighbors? More than 3   How often do you get together with friends or relatives? More than 3   How often do you attend Mandaen or Christianity services? More than 4   Do you belong to any clubs or organizations such as Mandaen groups, unions, fraternal or athletic groups, or school groups? No   How often do you attend meetings of the clubs or organizations you belong to? Never   Are you , , , , never , or living with a partner?     Intimate Partner Violence   Within the last year, have you been afraid of your partner or ex-partner? No   Within the last year, have you been humiliated or emotionally abused in other ways by your partner or ex-partner? No   Within the last year, have you been kicked, hit, slapped, or otherwise physically hurt by your partner or ex-partner? No   Within the last year, have you been raped or forced to have any kind of sexual activity by your partner or ex-partner? No   Alcohol Use   Q1: How often do you have a drink containing alcohol? 2-4 pr month   Q2: How many drinks containing alcohol do you have on a typical day when you are drinking? 1 or 2   Q3: How often do you have six or more drinks on one occasion? Never   Utilities   In the past 12 months has the electric, gas, oil, or water company threatened to shut off services in your home? No   Health Literacy   How often do you need to have someone help you when you read instructions, pamphlets, or other written material from your doctor or pharmacy? Never

## 2024-05-31 NOTE — PROGRESS NOTES
"   05/31/24 1807   Discharge Planning   Living Arrangements Alone   Support Systems Family members   Type of Residence Private residence   Number of Stairs to Enter Residence 0   Number of Stairs Within Residence 0   Do you have animals or pets at home? No   Who is requesting discharge planning? Provider   Home or Post Acute Services In home services   Type of Home Care Services Home OT;Home PT   Patient expects to be discharged to: Pt said that [facility] has arranged for [facility] to do home PT; she would like to return home; she will call the PT company when she gets home   Does the patient need discharge transport arranged? No   Financial Resource Strain   How hard is it for you to pay for the very basics like food, housing, medical care, and heating? Not hard   Housing Stability   In the last 12 months, was there a time when you were not able to pay the mortgage or rent on time? N   In the last 12 months, how many places have you lived? 1   In the last 12 months, was there a time when you did not have a steady place to sleep or slept in a shelter (including now)? N   Transportation Needs   In the past 12 months, has lack of transportation kept you from medical appointments or from getting medications? no   In the past 12 months, has lack of transportation kept you from meetings, work, or from getting things needed for daily living? No   Patient Choice   Provider Choice list and CMS website (https://medicare.gov/care-compare#search) for post-acute Quality and Resource Measure Data were provided and reviewed with: Patient   Patient / Family choosing to utilize agency / facility established prior to hospitalization Yes                                  "

## 2024-05-31 NOTE — PROGRESS NOTES
05/31/24 1809   [facility] Disability Status   Are you deaf or do you have serious difficulty hearing? N   Are you blind or do you have serious difficulty seeing, even when wearing glasses? N   Because of a physical, mental, or emotional condition, do you have serious difficulty concentrating, remembering, or making decisions? (5 years old or older) N   Do you have serious difficulty walking or climbing stairs? Y   Do you have serious difficulty dressing or bathing? N   Because of a physical, mental, or emotional condition, do you have serious difficulty doing errands alone such as visiting the doctor? Y

## 2024-05-31 NOTE — PROGRESS NOTES
05/31/24 1814   Discharge Planning   Living Arrangements Spouse/significant other   Support Systems Spouse/significant other   Type of Residence Private residence   Number of Stairs to Enter Residence 2   Number of Stairs Within Residence 0   Do you have animals or pets at home? Yes   Type of Animals or Pets dog   Who is requesting discharge planning? Provider   Home or Post Acute Services Other (Comment)  (Unknown at this time if pt will need HHC for wound)   Patient expects to be discharged to: Home; pt may possibly need HHC for wound care   Does the patient need discharge transport arranged? No   Financial Resource Strain   How hard is it for you to pay for the very basics like food, housing, medical care, and heating? Not hard   Housing Stability   In the last 12 months, was there a time when you were not able to pay the mortgage or rent on time? N   In the last 12 months, how many places have you lived? 1   In the last 12 months, was there a time when you did not have a steady place to sleep or slept in a shelter (including now)? N   Transportation Needs   In the past 12 months, has lack of transportation kept you from medical appointments or from getting medications? no   In the past 12 months, has lack of transportation kept you from meetings, work, or from getting things needed for daily living? No   Patient Choice   Patient / Family choosing to utilize agency / facility established prior to hospitalization No

## 2024-05-31 NOTE — ED PROVIDER NOTES
HPI   Chief Complaint   Patient presents with    Foot Injury       HPI  See my MDM                  Grady Coma Scale Score: 15                     Patient History   No past medical history on file.  Past Surgical History:   Procedure Laterality Date    BI STEREOTACTIC GUIDED BREAST RIGHT LOCALIZATION AND BIOPSY Right 12/4/2014    BI STEREOTACTIC GUIDED BREAST LOCALIZATION AND BIOPSY RIGHT [facility]     No family history on file.  Social History     Tobacco Use    Smoking status: Not on file    Smokeless tobacco: Not on file   Substance Use Topics    Alcohol use: Not on file    Drug use: Not on file       Physical Exam   ED Triage Vitals [05/31/24 1253]   Temperature Heart Rate Respirations BP   36.5 °C (97.7 °F) 89 16 (!) 226/62      Pulse Ox Temp Source Heart Rate Source Patient Position   99 % Temporal Monitor Sitting      BP Location FiO2 (%)     Left arm --       Physical Exam  CONSTITUTIONAL: Vital signs reviewed as charted, well-developed and in no distress  Eyes: Extraocular muscles are intact. Pupils equal round and reactive to light. Conjunctiva are pink.    ENT: Mucous membranes are moist. Tongue in the midline. Pharynx was without erythema or exudates, uvula midline  LUNGS: Breath sounds equal and clear to auscultation. Good air exchange, no wheezes rales or retractions, pulse oximetry is charted.  HEART: Regular rate and rhythm without murmur thrill or rub, strong tones, auscultation is normal.  ABDOMEN: Soft and nontender without guarding rebound rigidity or mass. Bowel sounds are present and normal in all quadrants. There is no palpable masses or aneurysms identified. No hepatosplenomegaly, normal abdominal exam.  Neuro: The patient is awake, alert and oriented ×3. Moving all 4 extremities and answering questions appropriately.   MUSCULOSKELETAL: The calves are nontender to palpation. Full gross active range of motion.   PSYCH: Awake alert oriented, normal mood and affect.  Skin:  Dry,  "normal color, warm to the touch, no rash present.  Exam of the right foot does show an opening underlying the first metatarsal head there is surrounding edema erythema.  There is some purulent drainage from the wound.  Does not cross over the ankle joint.    ED Course & MDM   Diagnoses as of 05/31/24 1410   Cellulitis and abscess of toe of right foot   Retained foreign body of foot       Medical Decision Making  History obtained from: patient    Vital signs, nursing notes, current medications, past medical history, Surgical history, allergies, social history, family History were reviewed.         HPI:  Patient is a 68-year-old female history of diabetes mellitus and hypertension sent to ED today from urgent care for evaluation of a wound to her right foot.  She is unsure what exactly she stepped on but was cleaning up glass with shoes on and thinks it might be a sliver of glass.  They did an x-ray at the urgent care showing a 1.1 cm foreign body.  She does have surrounding erythema edema and some pus coming out of the wound.  She does have history of diabetes mellitus.  Denies fever chills or night sweats.  She is hypertensive today and admits she did not take her medication.      10 point ROS was reviewed and negative except Noted above in HPI.  DDX: as listed above          MDM Summary/considerations:  EMERGENCY DEPARTMENT COURSE and DIFFERENTIAL DIAGNOSIS/MDM:    The patient presented with a chief complaint of right foot wound, retained foreign body, cellulitis and abscess. The differential diagnosis associated with this patient's presentation includes cellulitis, abscess, sepsis, diabetic foot wound.     Vitals:    Vitals:    05/31/24 1253   BP: (!) 226/62   BP Location: Left arm   Patient Position: Sitting   Pulse: 89   Resp: 16   Temp: 36.5 °C (97.7 °F)   TempSrc: Temporal   SpO2: 99%   Weight: 86.2 kg (190 lb)   Height: 1.676 m (5' 6\")       Diagnoses as of 05/31/24 1410   Cellulitis and abscess of toe of " right foot   Retained foreign body of foot       History Limited by:    None    Independent history obtained from:    None    External records reviewed:    X-ray and note from urgent care    Diagnostics interpreted by me:    None    Discussions with other clinicians:    Hospitalist/Admitting Team Tisha canales    Chronic conditions impacting care:    Diabetes and Hypertension    Social determinants of health affecting care:    None    Diagnostic tests considered but not performed: none    ED Medications managed:    Medications   vancomycin-diluent combo no.1 (Xellia) IVPB 1,250 mg (1,250 mg intravenous New Bag 5/31/24 1338)   valsartan (Diovan) tablet 320 mg (has no administration in time range)   piperacillin-tazobactam-dextrose (Zosyn) IV 4.5 g (4.5 g intravenous New Bag 5/31/24 1338)   amLODIPine (Norvasc) tablet 10 mg (10 mg oral Given 5/31/24 1338)       Prescription drugs considered:    None    Screenings:          Labs Reviewed   CBC WITH AUTO DIFFERENTIAL - Abnormal       Result Value    WBC 12.6 (*)     nRBC 0.0      RBC 3.63 (*)     Hemoglobin 12.3      Hematocrit 37.4       (*)     MCH 33.9      MCHC 32.9      RDW 12.3      Platelets 236      Neutrophils % 77.2      Immature Granulocytes %, Automated 0.6      Lymphocytes % 13.3      Monocytes % 7.9      Eosinophils % 0.6      Basophils % 0.4      Neutrophils Absolute 9.72 (*)     Immature Granulocytes Absolute, Automated 0.07      Lymphocytes Absolute 1.68      Monocytes Absolute 1.00      Eosinophils Absolute 0.08      Basophils Absolute 0.05     COMPREHENSIVE METABOLIC PANEL - Abnormal    Glucose 166 (*)     Sodium 134      Potassium 4.8      Chloride 97      Bicarbonate 24      Urea Nitrogen 15      Creatinine 0.80      eGFR 80      Calcium 9.8      Albumin 4.6      Alkaline Phosphatase 75      Total Protein 8.4 (*)     AST 13      Bilirubin, Total 0.8      ALT 10      Anion Gap 13     BLOOD GAS LACTIC ACID, VENOUS - Normal    POCT Lactate, Venous  1.9     BLOOD CULTURE   BLOOD CULTURE     No orders to display     Medications   vancomycin-diluent combo no.1 (Xellia) IVPB 1,250 mg (1,250 mg intravenous New Bag 5/31/24 1338)   valsartan (Diovan) tablet 320 mg (has no administration in time range)   piperacillin-tazobactam-dextrose (Zosyn) IV 4.5 g (4.5 g intravenous New Bag 5/31/24 1338)   amLODIPine (Norvasc) tablet 10 mg (10 mg oral Given 5/31/24 1338)     New Prescriptions    No medications on file     I spoke with Dr. Lang. We thoroughly discussed the history, physical exam, laboratory and imaging studies, as well as, emergency department course. Based upon that discussion, we've decided to admit for further observation and evaluation of their cellulitis.  As I have deemed necessary from their history, physical, and studies, I have considered and evaluated for the following diagnoses: DEEP SPACE INFECTIONS, DEEP VENOUS THROMBOSIS, DIRK'S GANGRENE, S EPSIS, and TOXIC SHOCK SYNDROME.     Patient does have leukocytosis with leftward shift on her blood work.  X-ray done at the urgent care in our system does show retained foreign body.  Patient was started on broad-spectrum antibiotics will be admitted for further evaluation and care.  Does have contributing factors such as diabetes mellitus for poor wound healing.    After reviewing patient's comorbidities, severity of history of presenting illness, labs and imaging if obtained in conjunction with physical exam and course in emergency department, deemed to have potential for deterioration/progression of symptoms that could lead to multiple morbidities or mortality, decision made that patient requires further observation/evaluation/treatment and patient admitted to appropriate service, patient/family understand and agree with plan.      I have evaluated this patient, my supervising physician was available for consultation.      Critical Care: Not warranted at this time        This chart was completed using  voice recognition transcription software. Please excuse any errors of transcription including grammatical, punctuation, syntax and spelling errors.  Please contact me with any questions regarding this chart.    Procedure  Procedures     TERI Mackay-CNP  05/31/24 4180

## 2024-05-31 NOTE — PROGRESS NOTES
05/31/24 1811   Physical Activity   On average, how many days per week do you engage in moderate to strenuous exercise (like a brisk walk)? 0 days   On average, how many minutes do you engage in exercise at this level? 0 min   Financial Resource Strain   How hard is it for you to pay for the very basics like food, housing, medical care, and heating? Not hard   Housing Stability   In the last 12 months, was there a time when you were not able to pay the mortgage or rent on time? N   In the last 12 months, how many places have you lived? 1   In the last 12 months, was there a time when you did not have a steady place to sleep or slept in a shelter (including now)? N   Transportation Needs   In the past 12 months, has lack of transportation kept you from medical appointments or from getting medications? no   In the past 12 months, has lack of transportation kept you from meetings, work, or from getting things needed for daily living? No   Food Insecurity   Within the past 12 months, you worried that your food would run out before you got the money to buy more. Never true   Within the past 12 months, the food you bought just didn't last and you didn't have money to get more. Never true   Stress   Do you feel stress - tense, restless, nervous, or anxious, or unable to sleep at night because your mind is troubled all the time - these days? Not at all   Social Connections   In a typical week, how many times do you talk on the phone with family, friends, or neighbors? More than 3   How often do you get together with friends or relatives? More than 3   How often do you attend Sabianism or Cheondoism services? More than 4   Do you belong to any clubs or organizations such as Sabianism groups, unions, fraternal or athletic groups, or school groups? No   How often do you attend meetings of the clubs or organizations you belong to? Never   Are you , , , , never , or living with a partner?     Intimate Partner Violence   Within the last year, have you been afraid of your partner or ex-partner? No   Within the last year, have you been humiliated or emotionally abused in other ways by your partner or ex-partner? No   Within the last year, have you been kicked, hit, slapped, or otherwise physically hurt by your partner or ex-partner? No   Within the last year, have you been raped or forced to have any kind of sexual activity by your partner or ex-partner? No   Alcohol Use   Q1: How often do you have a drink containing alcohol? 2-4 pr month   Q2: How many drinks containing alcohol do you have on a typical day when you are drinking? 1 or 2   Q3: How often do you have six or more drinks on one occasion? Never   Utilities   In the past 12 months has the electric, gas, oil, or water company threatened to shut off services in your home? No   Health Literacy   How often do you need to have someone help you when you read instructions, pamphlets, or other written material from your doctor or pharmacy? Never

## 2024-06-01 LAB
ALBUMIN SERPL-MCNC: 4 G/DL (ref 3.5–5)
ALP BLD-CCNC: 57 U/L (ref 35–125)
ALT SERPL-CCNC: 6 U/L (ref 5–40)
ANION GAP SERPL CALC-SCNC: 12 MMOL/L
AST SERPL-CCNC: 9 U/L (ref 5–40)
BILIRUB SERPL-MCNC: 0.7 MG/DL (ref 0.1–1.2)
BUN SERPL-MCNC: 16 MG/DL (ref 8–25)
CALCIUM SERPL-MCNC: 8.9 MG/DL (ref 8.5–10.4)
CHLORIDE SERPL-SCNC: 96 MMOL/L (ref 97–107)
CO2 SERPL-SCNC: 25 MMOL/L (ref 24–31)
CREAT SERPL-MCNC: 0.9 MG/DL (ref 0.4–1.6)
EGFRCR SERPLBLD CKD-EPI 2021: 69 ML/MIN/1.73M*2
ERYTHROCYTE [DISTWIDTH] IN BLOOD BY AUTOMATED COUNT: 12 % (ref 11.5–14.5)
GLUCOSE BLD MANUAL STRIP-MCNC: 142 MG/DL (ref 74–99)
GLUCOSE BLD MANUAL STRIP-MCNC: 171 MG/DL (ref 74–99)
GLUCOSE BLD MANUAL STRIP-MCNC: 183 MG/DL (ref 74–99)
GLUCOSE BLD MANUAL STRIP-MCNC: 202 MG/DL (ref 74–99)
GLUCOSE SERPL-MCNC: 183 MG/DL (ref 65–99)
HCT VFR BLD AUTO: 32.3 % (ref 36–46)
HGB BLD-MCNC: 11.1 G/DL (ref 12–16)
MCH RBC QN AUTO: 33.9 PG (ref 26–34)
MCHC RBC AUTO-ENTMCNC: 34.4 G/DL (ref 32–36)
MCV RBC AUTO: 99 FL (ref 80–100)
NRBC BLD-RTO: 0 /100 WBCS (ref 0–0)
PLATELET # BLD AUTO: 216 X10*3/UL (ref 150–450)
POTASSIUM SERPL-SCNC: 4 MMOL/L (ref 3.4–5.1)
PROT SERPL-MCNC: 7 G/DL (ref 5.9–7.9)
RBC # BLD AUTO: 3.27 X10*6/UL (ref 4–5.2)
SODIUM SERPL-SCNC: 133 MMOL/L (ref 133–145)
VANCOMYCIN SERPL-MCNC: 20.7 UG/ML (ref 10–20)
WBC # BLD AUTO: 11.7 X10*3/UL (ref 4.4–11.3)

## 2024-06-01 PROCEDURE — 97535 SELF CARE MNGMENT TRAINING: CPT | Mod: GO

## 2024-06-01 PROCEDURE — 87070 CULTURE OTHR SPECIMN AEROBIC: CPT | Mod: WESLAB | Performed by: PODIATRIST

## 2024-06-01 PROCEDURE — 97165 OT EVAL LOW COMPLEX 30 MIN: CPT | Mod: GO

## 2024-06-01 PROCEDURE — 97161 PT EVAL LOW COMPLEX 20 MIN: CPT | Mod: GP | Performed by: PHYSICAL THERAPIST

## 2024-06-01 PROCEDURE — 2500000001 HC RX 250 WO HCPCS SELF ADMINISTERED DRUGS (ALT 637 FOR MEDICARE OP): Performed by: INTERNAL MEDICINE

## 2024-06-01 PROCEDURE — 36415 COLL VENOUS BLD VENIPUNCTURE: CPT | Performed by: NURSE PRACTITIONER

## 2024-06-01 PROCEDURE — 2500000004 HC RX 250 GENERAL PHARMACY W/ HCPCS (ALT 636 FOR OP/ED): Mod: JZ | Performed by: NURSE PRACTITIONER

## 2024-06-01 PROCEDURE — 85027 COMPLETE CBC AUTOMATED: CPT | Performed by: NURSE PRACTITIONER

## 2024-06-01 PROCEDURE — 82947 ASSAY GLUCOSE BLOOD QUANT: CPT

## 2024-06-01 PROCEDURE — G0378 HOSPITAL OBSERVATION PER HR: HCPCS

## 2024-06-01 PROCEDURE — 80053 COMPREHEN METABOLIC PANEL: CPT | Performed by: NURSE PRACTITIONER

## 2024-06-01 PROCEDURE — 2500000002 HC RX 250 W HCPCS SELF ADMINISTERED DRUGS (ALT 637 FOR MEDICARE OP, ALT 636 FOR OP/ED): Mod: MUE | Performed by: NURSE PRACTITIONER

## 2024-06-01 PROCEDURE — 87186 SC STD MICRODIL/AGAR DIL: CPT | Mod: WESLAB | Performed by: PODIATRIST

## 2024-06-01 PROCEDURE — 80202 ASSAY OF VANCOMYCIN: CPT | Performed by: NURSE PRACTITIONER

## 2024-06-01 PROCEDURE — 1210000001 HC SEMI-PRIVATE ROOM DAILY

## 2024-06-01 PROCEDURE — 2500000001 HC RX 250 WO HCPCS SELF ADMINISTERED DRUGS (ALT 637 FOR MEDICARE OP): Performed by: NURSE PRACTITIONER

## 2024-06-01 PROCEDURE — 97530 THERAPEUTIC ACTIVITIES: CPT | Mod: GP | Performed by: PHYSICAL THERAPIST

## 2024-06-01 RX ORDER — HYDROCODONE BITARTRATE AND ACETAMINOPHEN 5; 325 MG/1; MG/1
1 TABLET ORAL EVERY 6 HOURS PRN
Status: DISCONTINUED | OUTPATIENT
Start: 2024-06-01 | End: 2024-06-03 | Stop reason: HOSPADM

## 2024-06-01 RX ORDER — VANCOMYCIN 1.5 G/300ML
1500 INJECTION, SOLUTION INTRAVENOUS EVERY 24 HOURS
Status: DISCONTINUED | OUTPATIENT
Start: 2024-06-02 | End: 2024-06-02

## 2024-06-01 RX ORDER — INSULIN LISPRO 100 [IU]/ML
0-15 INJECTION, SOLUTION INTRAVENOUS; SUBCUTANEOUS
Status: DISCONTINUED | OUTPATIENT
Start: 2024-06-01 | End: 2024-06-03 | Stop reason: HOSPADM

## 2024-06-01 RX ADMIN — HEPARIN SODIUM 5000 UNITS: 5000 INJECTION, SOLUTION INTRAVENOUS; SUBCUTANEOUS at 06:21

## 2024-06-01 RX ADMIN — CILOSTAZOL 50 MG: 50 TABLET ORAL at 20:48

## 2024-06-01 RX ADMIN — PANTOPRAZOLE SODIUM 40 MG: 40 TABLET, DELAYED RELEASE ORAL at 06:20

## 2024-06-01 RX ADMIN — VANCOMYCIN 1000 MG: 1 INJECTION, SOLUTION INTRAVENOUS at 01:15

## 2024-06-01 RX ADMIN — VALSARTAN 80 MG: 80 TABLET, FILM COATED ORAL at 08:23

## 2024-06-01 RX ADMIN — PIPERACILLIN SODIUM AND TAZOBACTAM SODIUM 3.38 G: 3; .375 INJECTION, SOLUTION INTRAVENOUS at 06:20

## 2024-06-01 RX ADMIN — ACETAMINOPHEN 650 MG: 325 TABLET ORAL at 20:48

## 2024-06-01 RX ADMIN — ASPIRIN 81 MG: 81 TABLET, COATED ORAL at 08:23

## 2024-06-01 RX ADMIN — ATORVASTATIN CALCIUM 80 MG: 80 TABLET, FILM COATED ORAL at 20:48

## 2024-06-01 RX ADMIN — EZETIMIBE 10 MG: 10 TABLET ORAL at 08:23

## 2024-06-01 RX ADMIN — HEPARIN SODIUM 5000 UNITS: 5000 INJECTION, SOLUTION INTRAVENOUS; SUBCUTANEOUS at 20:51

## 2024-06-01 RX ADMIN — HEPARIN SODIUM 5000 UNITS: 5000 INJECTION, SOLUTION INTRAVENOUS; SUBCUTANEOUS at 13:57

## 2024-06-01 RX ADMIN — AMLODIPINE BESYLATE 10 MG: 10 TABLET ORAL at 08:23

## 2024-06-01 RX ADMIN — FUROSEMIDE 20 MG: 20 TABLET ORAL at 08:23

## 2024-06-01 RX ADMIN — PIPERACILLIN SODIUM AND TAZOBACTAM SODIUM 3.38 G: 3; .375 INJECTION, SOLUTION INTRAVENOUS at 01:15

## 2024-06-01 RX ADMIN — CILOSTAZOL 50 MG: 50 TABLET ORAL at 08:24

## 2024-06-01 RX ADMIN — CHLORTHALIDONE 25 MG: 25 TABLET ORAL at 08:23

## 2024-06-01 RX ADMIN — PIPERACILLIN SODIUM AND TAZOBACTAM SODIUM 3.38 G: 3; .375 INJECTION, SOLUTION INTRAVENOUS at 19:41

## 2024-06-01 RX ADMIN — CLOPIDOGREL BISULFATE 75 MG: 75 TABLET ORAL at 08:23

## 2024-06-01 RX ADMIN — METFORMIN HYDROCHLORIDE 1000 MG: 1000 TABLET ORAL at 17:00

## 2024-06-01 RX ADMIN — ACETAMINOPHEN 650 MG: 325 TABLET ORAL at 12:21

## 2024-06-01 RX ADMIN — HYDROCODONE BITARTRATE AND ACETAMINOPHEN 1 TABLET: 5; 325 TABLET ORAL at 15:49

## 2024-06-01 RX ADMIN — METFORMIN HYDROCHLORIDE 1000 MG: 1000 TABLET ORAL at 08:23

## 2024-06-01 RX ADMIN — PIPERACILLIN SODIUM AND TAZOBACTAM SODIUM 3.38 G: 3; .375 INJECTION, SOLUTION INTRAVENOUS at 12:22

## 2024-06-01 ASSESSMENT — COGNITIVE AND FUNCTIONAL STATUS - GENERAL
TOILETING: A LITTLE
MOBILITY SCORE: 21
STANDING UP FROM CHAIR USING ARMS: A LITTLE
CLIMB 3 TO 5 STEPS WITH RAILING: A LITTLE
WALKING IN HOSPITAL ROOM: A LITTLE
MOBILITY SCORE: 20
WALKING IN HOSPITAL ROOM: A LITTLE
STANDING UP FROM CHAIR USING ARMS: A LITTLE
DAILY ACTIVITIY SCORE: 20
CLIMB 3 TO 5 STEPS WITH RAILING: A LITTLE
HELP NEEDED FOR BATHING: A LITTLE
WALKING IN HOSPITAL ROOM: A LITTLE
DAILY ACTIVITIY SCORE: 23
TOILETING: A LITTLE
PERSONAL GROOMING: A LITTLE
CLIMB 3 TO 5 STEPS WITH RAILING: A LITTLE
STANDING UP FROM CHAIR USING ARMS: A LITTLE
DRESSING REGULAR LOWER BODY CLOTHING: A LITTLE
MOVING TO AND FROM BED TO CHAIR: A LITTLE
MOVING TO AND FROM BED TO CHAIR: A LITTLE
DAILY ACTIVITIY SCORE: 24

## 2024-06-01 ASSESSMENT — PAIN SCALES - GENERAL
PAINLEVEL_OUTOF10: 0 - NO PAIN
PAINLEVEL_OUTOF10: 6
PAINLEVEL_OUTOF10: 6
PAINLEVEL_OUTOF10: 3
PAINLEVEL_OUTOF10: 6
PAINLEVEL_OUTOF10: 3
PAINLEVEL_OUTOF10: 6
PAINLEVEL_OUTOF10: 5 - MODERATE PAIN

## 2024-06-01 ASSESSMENT — ACTIVITIES OF DAILY LIVING (ADL)
ADL_ASSISTANCE: INDEPENDENT
HOME_MANAGEMENT_TIME_ENTRY: 15
BATHING_ASSISTANCE: MINIMAL

## 2024-06-01 ASSESSMENT — PAIN DESCRIPTION - ORIENTATION
ORIENTATION: RIGHT
ORIENTATION: RIGHT

## 2024-06-01 ASSESSMENT — PAIN - FUNCTIONAL ASSESSMENT
PAIN_FUNCTIONAL_ASSESSMENT: FLACC (FACE, LEGS, ACTIVITY, CRY, CONSOLABILITY)
PAIN_FUNCTIONAL_ASSESSMENT: 0-10

## 2024-06-01 ASSESSMENT — PAIN DESCRIPTION - DESCRIPTORS: DESCRIPTORS: THROBBING

## 2024-06-01 ASSESSMENT — PAIN DESCRIPTION - LOCATION
LOCATION: FOOT
LOCATION: FOOT

## 2024-06-01 NOTE — PROGRESS NOTES
Vancomycin Dosing by Pharmacy- FOLLOW UP    Lottie Rodríguez is a 68 y.o. year old female who Pharmacy has been consulted for vancomycin dosing for cellulitis, skin and soft tissue. Based on the patient's indication and renal status this patient is being dosed based on a goal AUC of 400-600.     Renal function is currently stable.    Current vancomycin dose: 1000 mg given every 12 hours    Estimated vancomycin AUC on current dose: 697 mg/L.hr     Visit Vitals  BP (!) 164/41 (BP Location: Left arm)   Pulse 65   Temp 36.9 °C (98.4 °F) (Oral)   Resp 18        Lab Results   Component Value Date    CREATININE 0.90 2024    CREATININE 0.80 2024    CREATININE 0.80 2024    CREATININE 1.40 2024        Patient weight is as follows:   Vitals:    24 1253   Weight: 86.2 kg (190 lb)       Cultures:  No results found for the encounter in last 14 days.       I/O last 3 completed shifts:  In: 350 (4.1 mL/kg) [IV Piggyback:350]  Out: 450 (5.2 mL/kg) [Urine:450 (0.1 mL/kg/hr)]  Weight: 86.2 kg   I/O during current shift:  No intake/output data recorded.    Temp (24hrs), Av.7 °C (98 °F), Min:36.5 °C (97.7 °F), Max:36.9 °C (98.4 °F)      Assessment/Plan    Above goal AUC. Orders placed for new vancomcyin regimen of 1500 mg every 24 hours to begin at 0000 .    This dosing regimen is predicted by [employer] to result in the following pharmacokinetic parameters:  Loading dose: N/A  Regimen: 1500 mg IV every 24 hours.  Start time: 13:15 on 2024  Exposure target: AUC24 (range)400-600 mg/L.hr   AUC24,ss: 531 mg/L.hr  Probability of AUC24 > 400: 92 %  Ctrough,ss: 13.8 mg/L  Probability of Ctrough,ss > 20: 13 %  Probability of nephrotoxicity (Lodise VIDYA ): 9 %    The next level will be obtained on  at 0500. May be obtained sooner if clinically indicated.   Will continue to monitor renal function daily while on vancomycin and order serum creatinine at least every 48 hours if not already  ordered.  Follow for continued vancomycin needs, clinical response, and signs/symptoms of toxicity.       Sandra Blount, PharmD

## 2024-06-01 NOTE — PROGRESS NOTES
Physical Therapy    Physical Therapy Evaluation & Treatment    Patient Name: Lottie Rodríguez  MRN: [id number]  Today's Date: 6/1/2024   Time Calculation  Start Time: 0838  Stop Time: 0905  Time Calculation (min): 27 min    Assessment/Plan   PT Assessment  PT Assessment Results: Decreased strength, Decreased range of motion, Decreased endurance, Pain, Impaired balance  Rehab Prognosis: Good  Evaluation/Treatment Tolerance: Patient tolerated treatment well  Assessment Comment: Pt presented to therapy with funcitonal mobiltiy concerns following recent removal of foreign body of R foot requiring surgical shoe. Surgicial shoe was fitted and provided educated on donning/doffing of shoe with proper fit requirments. Pt demosntrated near independnet level with all funcitonal mobility tasks this date however required contact guard for safety due to limited mobility of R LE due to pain. At this time recommending low intensity discharge however will continue to treat and assess throughout acute care.  End of Session Patient Position:  (Seated EOB with breakfast)   IP OR SWING BED PT PLAN  Inpatient or Swing Bed: Inpatient  PT Plan  Treatment/Interventions: Bed mobility, Transfer training, Gait training, Stair training, Balance training, Neuromuscular re-education, Strengthening, Endurance training, Range of motion, Therapeutic exercise, Therapeutic activity, Home exercise program, Orthotic fitting/training  PT Plan: Skilled PT  PT Frequency: 3 times per week  PT Discharge Recommendations: Low intensity level of continued care  PT Recommended Transfer Status: Assist x1  PT - OK to Discharge: Yes      Subjective     General Visit Information:  General  Reason for Referral: Cellulitis and abscess of R foot/toe  Referred By: Dr. Lang  Past Medical History Relevant to Rehab: HTN, hyperlipidemia, PVD, DM 2, neuropathy  Prior to Session Communication: Bedside nurse  General Comment: Pt is a 68 y.o female admitted to [facility] with abscess  of R foot found to have foreign body imbedded in R toe, removed this date 6/1/24 by podiatry, referred to PT for assessment.  Home Living:  Home Living  Type of Home: House  Lives With: Spouse, Adult children  Home Layout: One level  Home Access: Stairs to enter with rails  Prior Level of Function:  Prior Function Per Pt/Caregiver Report  Level of [place]: Independent with ADLs and functional transfers, Independent with homemaking with ambulation  Precautions:  Precautions  LE Weight Bearing Status: Weight Bearing as Tolerated  Precautions Comment: Surgical Shoe donned    Objective   Pain:  Pain Assessment  Pain Assessment: 0-10  Pain Score: 6    General Assessments:    Sensation  Sensation Comment: History of Neuropathy    Strength  Strength Comments: B LE 5/5 WNL  Coordination  Movements are Fluid and Coordinated: Yes    Postural Control  Postural Control: Within Functional Limits    Static Sitting Balance  Static Sitting-Balance Support: No upper extremity supported  Dynamic Sitting Balance  Dynamic Sitting-Balance Support: No upper extremity supported    Static Standing Balance  Static Standing-Balance Support: Bilateral upper extremity supported  Static Standing-Level of Assistance: Contact guard  Dynamic Standing Balance  Dynamic Standing-Balance Support: Bilateral upper extremity supported  Dynamic Standing-Balance: Turning  Functional Assessments:  Bed Mobility  Bed Mobility: Yes  Bed Mobility 1  Bed Mobility 1: Supine to sitting  Level of Assistance 1: Close supervision    Transfers  Transfer: Yes  Transfer 1  Transfer From 1: Sit to, Stand to  Transfer to 1: Stand, Sit  Technique 1: Sit to stand, Stand to sit  Transfer Device 1: Walker  Transfer Level of Assistance 1: Contact guard  Trials/Comments 1: Verbal cues for hand placement and positioning of R LE with surgical shoe donned    Ambulation/Gait Training  Ambulation/Gait Training Performed: Yes  Ambulation/Gait Training 1  Surface 1: Level  tile  Device 1: Rolling walker  Assistance 1: Contact guard  Quality of Gait 1: Shuffling gait, Diminished heel strike, Decreased step length    Treatments:  Therapeutic Activity  Therapeutic Activity Performed: Yes  Therapeutic Activity 1: Educated on proper donning/doffing of surgical shoe and ambulation with use of assistive device  Outcome Measures:  [facility] Basic Mobility  Turning from your back to your side while in a flat bed without using bedrails: None  Moving from lying on your back to sitting on the side of a flat bed without using bedrails: None  Moving to and from bed to chair (including a wheelchair): None  Standing up from a chair using your arms (e.g. wheelchair or bedside chair): A little  To walk in hospital room: A little  Climbing 3-5 steps with railing: A little  Basic Mobility - Total Score: 21    Encounter Problems       Encounter Problems (Active)       Balance       LTG - Patient will demonstrate Intervention to enhance balance for safe completion of daily activities       Start:  06/01/24    Expected End:  06/13/24            LTG - Patient will maintain balance       Start:  06/01/24    Expected End:  06/13/24            LTG - Patient will maintain balance to allow for safe mobility       Start:  06/01/24    Expected End:  06/13/24               Mobility       LTG - Patient will ambulate community distance       Start:  06/01/24    Expected End:  06/13/24               Pain          Pain - Adult          Safety       LTG - Patient will demonstrate safety requirements appropriate to situation/environment       Start:  06/01/24    Expected End:  06/13/24                   Education Documentation  No documentation found.  Education Comments  No comments found.

## 2024-06-01 NOTE — CONSULTS
Consults    Reason For Consult  Foreign body with abscess right foot    History Of Present Illness  Lottie Rodríguez is a 68 y.o. female presenting with primary complaint of increasing pain, swelling, and redness of her right foot.  Patient states that she stepped on a piece of glass inside of her house about 2 days ago.  She initially presented to urgent care.  X-rays of the right foot revealed foreign body.  An attempt was made to remove the foreign body which was unsuccessful.  Her pain increased and she was unable to bear weight on the ball of her right foot.  She has past medical history significant for type 2 diabetes with neuropathy.  States that her blood sugars are fairly well-controlled at this point.  Patient lives at home with her  and 2 sons.  She denies nausea vomiting fever or chills.  Patient was noted to have leukocytosis upon hospital admission.  She has been receiving IV antibiotics for 12 hours.     Past Medical History  She has a past medical history of Breast cancer (Multi), Diabetes mellitus (Multi), Hyperlipidemia, and Hypertension.    Surgical History  She has a past surgical history that includes BI stereotactic guided breast right localization and biopsy (Right, 12/04/2014); Breast lumpectomy (Right); and Coronary angioplasty with stent.     Social History  She reports that she has never smoked. She has never been exposed to tobacco smoke. She has never used smokeless tobacco. She reports current alcohol use of about 3.0 standard drinks of alcohol per week. She reports that she does not use drugs.    Family History  No family history on file.     Allergies  Penicillins    Review of Systems    REVIEW OF SYSTEMS  GENERAL:  Negative for malaise, significant weight loss, fever  HEENT:  No changes in hearing or vision, no nose bleeds or other nasal problems and Negative for frequent or significant headaches  NECK:  Negative for lumps, goiter, pain and significant neck swelling  RESPIRATORY:  " Negative for cough, wheezing and shortness of breath  CARDIOVASCULAR:  Negative for chest pain, leg swelling and palpitations  GI:  Negative for abdominal discomfort, blood in stools or black stools and change in bowel habits  :  Negative for dysuria, frequency and incontinence  MUSCULOSKELETAL:  Negative for joint pain or swelling, back pain, and muscle pain.  SKIN:  Negative for lesions, rash, and itching  PSYCH:  Negative for sleep disturbance, mood disorder and recent psychosocial stressors  HEMATOLOGY/LYMPHOLOGY:  Negative for prolonged bleeding, bruising easily, and swollen nodes.  ENDOCRINE:  Negative for cold or heat intolerance, polyuria, polydipsia and goiter  NEURO: Negative, denies any burning, tingling or numbness     Objective:   Vasc: DP and PT pulses are palpable bilateral.  CFT is less than 3 seconds bilateral.  Skin temperature is warm to cool proximal to distal bilateral.      Neuro:  Light touch is intact to the foot bilateral.  Protective sensation is diminished to the foot when tested with the 5.07 SWM bilateral.  There is no clonus noted.  The hallux is downgoing bilateral.      Derm: Puncture wound is noted to the plantar aspect of the right foot directly underneath the first metatarsal head.  Purulent discharge is noted from the puncture wound.  Surrounding erythema is noted.  Increased skin temperature warmth is noted.  Foreign body noted within the central aspect of the wound.    Ortho: Muscle strength is 5/5 for all pedal groups tested.  Ankle joint, subtalar joint, 1st MPJ and lesser MPJ ROM is full and without pain or crepitus.  The foot type is rectus bilateral off weight bearing.  There are no structural deformities noted.       Physical Exam     Last Recorded Vitals  Blood pressure 137/51, pulse 69, temperature 36.9 °C (98.4 °F), temperature source Oral, resp. rate 18, height 1.676 m (5' 6\"), weight 86.2 kg (190 lb), SpO2 97%.    Relevant Results  Radiographs positive for " radiopaque foreign body right foot appears to be glass     Assessment/Plan     Patient was seen today at bedside.  Examination of the right foot was performed.  I reviewed the patient's radiographs on today's visit.  Patient was noted to have a 1.5 cm piece of glass in the plantar aspect of her right foot.  I recommended attempting to remove the foreign body at bedside.  The right foot was prepped with Betadine.  Verbal consent was obtained.  A #15 blade was used to remove any overlying soft tissue.  Once epithelial tissue was removed approximately 5 cc of purulent discharge was expressed from the right foot.  A 1.5 cm piece of glass was removed from the plantar aspect of the right foot with forceps.  This was flushed with peroxide.  Betadine and gauze dressing was applied.  Deep cultures were obtained and sent for aerobic and anaerobic.  Recommended that the patient stay on IV antibiotics over the next 24 hours and will monitor to see if white count improves and erythema in the right foot decreases.  Patient may be weightbearing as tolerated with a surgical shoe.  I will follow-up with her in 24 hours    I spent 25 minutes in the professional and overall care of this patient.

## 2024-06-01 NOTE — CONSULTS
Consults    Referred by Dr Precious Lindsay MD: Joo Kirkland MD    Reason For Consult  Right foot wound    History Of Present Illness  Lottie Rodríguez is a 68 y.o. female presenting with increasing right foot pain, swelling, and redness. Unbeknownst to her, she stepped on a piece of class at home ~2 days prior. On admission, she was started on IV abx    No fever or chills but ++right foot pain    Podiatry evaluated the patient this AM and removed the foreign body. Cultures were sent    She noted continued pain, swelling a bit improved    Noted PCN allergy but tolerating pip tazo     Past Medical History  She has a past medical history of Breast cancer (Multi), Diabetes mellitus (Multi), Hyperlipidemia, and Hypertension.    Surgical History  She has a past surgical history that includes BI stereotactic guided breast right localization and biopsy (Right, 12/04/2014); Breast lumpectomy (Right); and Coronary angioplasty with stent.     Social History  She reports that she has never smoked. She has never been exposed to tobacco smoke. She has never used smokeless tobacco. She reports current alcohol use of about 3.0 standard drinks of alcohol per week. She reports that she does not use drugs.   Travel Screening       Question Response    Do you have any of the following new or worsening symptoms? None of these    In the last 10 days, have you been in contact with someone who was confirmed or suspected to have Coronavirus/COVID-19? No / Unsure    Have you had a COVID-19 viral test in the last 10 days? No    Have you traveled internationally or domestically in the last month? No          Travel History   Travel since 05/01/24    No documented travel since 05/01/24           Family History  No family history on file.  Allergies  Penicillins     Immunization History   Administered Date(s) Administered    Pfizer Purple Cap SARS-CoV-2 05/03/2021, 05/29/2021, 12/27/2021     Review of Systems  +right foot pain     Physical  "Exam  General: AAOx3, NAD, nontoxic appearing  Eyes: PERRL, EOMI, no scleral icterus  ENT: no oral thrush or lesions  Resp: normal resp effort  : no barajas  Ext: right foot mild edema  Skin: right foot erythema/wound     Range of Vitals (last 24 hours)  Heart Rate:  [65-85]   Temp:  [36.6 °C (97.9 °F)-36.9 °C (98.4 °F)]   Resp:  [16-18]   BP: (137-198)/(41-62)   SpO2:  [96 %-100 %]     Relevant Results  Lab Results   Component Value Date    WBC 11.7 (H) 06/01/2024    HGB 11.1 (L) 06/01/2024    HCT 32.3 (L) 06/01/2024    MCV 99 06/01/2024     06/01/2024      Results from last 72 hours   Lab Units 06/01/24  0634   SODIUM mmol/L 133   POTASSIUM mmol/L 4.0   CHLORIDE mmol/L 96*   CO2 mmol/L 25   BUN mg/dL 16   CREATININE mg/dL 0.90   GLUCOSE mg/dL 183*   CALCIUM mg/dL 8.9   ANION GAP mmol/L 12   EGFR mL/min/1.73m*2 69     Results from last 72 hours   Lab Units 06/01/24  0634   ALK PHOS U/L 57   BILIRUBIN TOTAL mg/dL 0.7   PROTEIN TOTAL g/dL 7.0   ALT U/L 6   AST U/L 9   ALBUMIN g/dL 4.0     Estimated Creatinine Clearance: 65.3 mL/min (by C-G formula based on SCr of 0.9 mg/dL).  No results found for: \"CRP\", \"SEDRATE\"  No results found for: \"HIV1X2\", \"HIVCONF\", \"YEBXRL1AY\"  No results found for: \"HCVPCRQUANT\"    Cultures/Micro  No results found for the last 14 days.  5/31 blood cx: NGTD  6/1 wound cx: pending     Imaging:  Xray foot  IMPRESSION:  Radiopaque foreign body within the plantar subcutaneous soft tissues  at the level of the 1st metatarsal head.    Assessment:  Right foot wound due to retained piece of glass  cellulitis     Plan/Recommendations:  Continue vancomycin dosed by pharmacy--monitoring for adverse abx effects  Continue pip tazo--tolerating despite PCN allergy  Anticipate IV abx through Monday   Follow up blood and wound cultures to deescalate abx as appropriate  Will follow    Ashley Salmon DO  [facility]  #[phone]      "

## 2024-06-01 NOTE — PROGRESS NOTES
Occupational Therapy    Evaluation/Treatment    Patient Name: Lottie Rodríguez  MRN: [id number]  : 1955  Today's Date: 24  Time Calculation  Start Time: 08  Stop Time: 09  Time Calculation (min): 25 min       Assessment:  OT Assessment: pt presents with R foot pain, reduced mobility and decreased standing tolerance which impedes ADL performance. pt would benefit from skilled OT services to address these deficits and to facilitate highest level of independence.  Prognosis: Good  Barriers to Discharge: None  Evaluation/Treatment Tolerance: Patient limited by pain  Medical Staff Made Aware: Yes  End of Session Communication: Bedside nurse  End of Session Patient Position: Bed, 2 rail up, Alarm off, not on at start of session  OT Assessment Results: Decreased ADL status, Decreased functional mobility  Prognosis: Good  Barriers to Discharge: None  Evaluation/Treatment Tolerance: Patient limited by pain  Medical Staff Made Aware: Yes  Strengths: Ability to acquire knowledge, Attitude of self  Barriers to Participation: Comorbidities  Plan:  Treatment Interventions: ADL retraining, Functional transfer training, Compensatory technique education, Equipment evaluation/education  OT Frequency: 3 times per week  OT Discharge Recommendations: Low intensity level of continued care  Equipment Recommended upon Discharge: Wheeled walker  OT Recommended Transfer Status: Assist of 1, Stand by assist  OT - OK to Discharge: Yes  Treatment Interventions: ADL retraining, Functional transfer training, Compensatory technique education, Equipment evaluation/education    General:   OT Received On: 24  General  Reason for Referral: 68 y.o. female presenting with primary complaint of increasing pain, swelling, and redness of her right foot after stepping on glass.  Podiatry had to remove 1.5 cm piece of glass in the plantar aspect of her right foot.  Past Medical History Relevant to Rehab: HTN, hyperlipidemia, PVD, DM 2,  neuropathy  Family/Caregiver Present: No  Co-Treatment: PT  Co-Treatment Reason: to maximize safety and participation  Prior to Session Communication: Bedside nurse  Patient Position Received: Bed, 2 rail up, Alarm off, not on at start of session  Preferred Learning Style: auditory, kinesthetic  General Comment: pt cooperative and receptive during session.  Right surgical shoe was fitted and provided during this session.  Precautions:  LE Weight Bearing Status: Weight Bearing as Tolerated (with Right surgical shoe)  Medical Precautions: Fall precautions  Vital Signs:     Pain:  Pain Assessment  Pain Assessment: 0-10  Pain Score: 6  Pain Type: Acute pain  Pain Location: Foot  Pain Orientation: Right    Objective   Cognition:  Overall Cognitive Status: Within Functional Limits  Orientation Level: Oriented X4           Home Living:  Type of Home: House  Lives With: Spouse, Adult children (x2 sons)  Home Adaptive Equipment: Walker rolling or standard  Home Layout: One level  Home Access: Stairs to enter without rails  Entrance Stairs-Rails: None  Entrance Stairs-Number of Steps: 2  Bathroom Shower/Tub: Walk-in shower  Bathroom Toilet: Standard  Bathroom Equipment: Built-in shower seat  Prior Function:  Level of [place]: Independent with ADLs and functional transfers, Independent with homemaking with ambulation  ADL Assistance: Independent  Homemaking Assistance: Independent  Ambulatory Assistance: Independent (no device)  Vocational: Retired  Prior Function Comments: +driving and community ambulator     ADL:  Eating Assistance: Independent  Eating Deficit:  (performed seated EOB)  Grooming Assistance: Stand by  Grooming Deficit:  (anticipated)  Bathing Assistance: Minimal  Bathing Deficit:  (anticipated)  UE Dressing Assistance: Independent  UE Dressing Deficit:  (anticipated)  LE Dressing Assistance: Minimal  LE Dressing Deficit:  (pt  thread BLE into pants while seated and stood with FWW and managed clothing over  hips at CGA for safety.  therapist provided demonstration for donning surgical shoe to R foot and had patient perform task.  pt requiring MIN A for adjusting straps)  Toileting Assistance with Device: Stand by  Toileting Deficit:  (anticipated)    Activity Tolerance:  Endurance: Endurance does not limit participation in activity  Functional Standing Tolerance:  <3 minutes     Bed Mobility/Transfers: Bed Mobility 1  Bed Mobility 1: Supine to sitting  Level of Assistance 1: Close supervision  Bed Mobility Comments 1: no physical assistance, performed with ease and with HOB slightly elevated    Transfers  Transfer: Yes  Transfer 1  Technique 1: Sit to stand, Stand to sit  Transfer Device 1: Walker  Transfer Level of Assistance 1: Contact guard  Trials/Comments 1: from edge of bed, min cues for hand placement.  pt with fair control when ascending/descending however limited WB through RLE noted due to pain  Transfers 2  Transfer to 2: Bed  Technique 2: Stand pivot  Transfer Device 2: Walker  Transfer Level of Assistance 2: Contact guard  Trials/Comments 2: pt transferred back to bed after functional mobility task with FWW      Functional Mobility:  Functional Mobility  Functional Mobility Performed: Yes  Functional Mobility 1  Surface 1: Level tile  Device 1: Rolling walker  Assistance 1: Contact guard  Comments 1: min household distances including to doorway and back.  pt requiring CGA for safety due to decreased WB through RLE due to pain.  min cues for sequencing of walker due to new learning.  Sitting Balance:  Static Sitting Balance  Static Sitting-Balance Support: Feet supported  Static Sitting-Level of Assistance: Independent  Standing Balance:  Static Standing Balance  Static Standing-Balance Support: Bilateral upper extremity supported  Static Standing-Level of Assistance: Contact guard, Close supervision     Vision:Vision - Basic Assessment  Current Vision: Wears glasses only for reading  Sensation:  Light  Touch: No apparent deficits  Strength:  Strength Comments: BUE grossly 4+/5     Perception:  Inattention/Neglect: Appears intact  Coordination:  Movements are Fluid and Coordinated: Yes   Hand Function:  Hand Function  Gross Grasp: Functional  Coordination: Functional  Extremities: RUE   RUE : Within Functional Limits and LUE   LUE: Within Functional Limits      Outcome Measures: [facility] Daily Activity  Putting on and taking off regular lower body clothing: A little  Bathing (including washing, rinsing, drying): A little  Putting on and taking off regular upper body clothing: None  Toileting, which includes using toilet, bedpan or urinal: A little  Taking care of personal grooming such as brushing teeth: A little  Eating Meals: None  Daily Activity - Total Score: 20        Education Documentation  Body Mechanics, taught by Davis Chacon OT at 6/1/2024 10:05 AM.  Learner: Patient  Readiness: Acceptance  Method: Explanation, Demonstration  Response: Verbalizes Understanding, Demonstrated Understanding    Precautions, taught by Davis Chacon OT at 6/1/2024 10:05 AM.  Learner: Patient  Readiness: Acceptance  Method: Explanation, Demonstration  Response: Verbalizes Understanding, Demonstrated Understanding    ADL Training, taught by Davis Chacon OT at 6/1/2024 10:05 AM.  Learner: Patient  Readiness: Acceptance  Method: Explanation, Demonstration  Response: Verbalizes Understanding, Demonstrated Understanding    Education Comments  No comments found.        OP EDUCATION:       Goals:  Encounter Problems       Encounter Problems (Active)       ADLs       Patient with complete lower body dressing with modified independent level of assistance donning and doffing all LE clothes  with PRN adaptive equipment while edge of bed  (Progressing)       Start:  06/01/24    Expected End:  06/15/24            Patient will complete daily grooming tasks with modified independent level of assistance and PRN adaptive equipment while  standing. (Progressing)       Start:  06/01/24    Expected End:  06/15/24            Patient will complete toileting including hygiene clothing management/hygiene with modified independent level of assistance and grab bars. (Progressing)       Start:  06/01/24    Expected End:  06/15/24               MOBILITY       Patient will perform Functional mobility max Household distances/Community Distances with modified independent level of assistance and least restrictive device in order to improve safety and functional mobility. (Progressing)       Start:  06/01/24    Expected End:  06/15/24               TRANSFERS       Patient will complete functional transfer to toilet with least restrictive device with modified independent level of assistance. (Progressing)       Start:  06/01/24    Expected End:  06/15/24

## 2024-06-01 NOTE — CARE PLAN
Problem: Skin  Goal: Decreased wound size/increased tissue granulation at next dressing change  Flowsheets (Taken 6/1/2024 0032)  Decreased wound size/increased tissue granulation at next dressing change:   Promote sleep for wound healing   Protective dressings over bony prominences   Utilize specialty bed per algorithm  Goal: Participates in plan/prevention/treatment measures  Flowsheets (Taken 6/1/2024 0032)  Participates in plan/prevention/treatment measures:   Discuss with provider PT/OT consult   Elevate heels   Increase activity/out of bed for meals  Goal: Prevent/manage excess moisture  Flowsheets (Taken 6/1/2024 0032)  Prevent/manage excess moisture:   Cleanse incontinence/protect with barrier cream   Follow provider orders for dressing changes   Moisturize dry skin   Monitor for/manage infection if present   Use wicking fabric (obtain order)  Goal: Prevent/minimize sheer/friction injuries  Flowsheets (Taken 6/1/2024 0032)  Prevent/minimize sheer/friction injuries:   Complete micro-shifts as needed if patient unable. Adjust patient position to relieve pressure points, not a full turn   HOB 30 degrees or less   Increase activity/out of bed for meals   Turn/reposition every 2 hours/use positioning/transfer devices   Use pull sheet   Utilize specialty bed per algorithm  Goal: Promote/optimize nutrition  Flowsheets (Taken 6/1/2024 0032)  Promote/optimize nutrition:   Assist with feeding   Consume > 50% meals/supplements   Discuss with provider if NPO > 2 days   Monitor/record intake including meals   Offer water/supplements/favorite foods   Reassess MST if dietician not consulted  Goal: Promote skin healing  Flowsheets (Taken 6/1/2024 0032)  Promote skin healing:   Assess skin/pad under line(s)/device(s)   Ensure correct size (line/device) and apply per  instructions   Protective dressings over bony prominences   Rotate device position/do not position patient on device   Turn/reposition every 2  hours/use positioning/transfer devices   The patient's goals for the shift include decrease pain    The clinical goals for the shift include control pain    Over the shift, the patient did not make progress toward the following goals. Barriers to progression include . Recommendations to address these barriers include .

## 2024-06-01 NOTE — PROGRESS NOTES
Lottie Rodríguez is a 68 y.o. female on day 1 of admission presenting with Cellulitis and abscess of toe of right foot.    Subjective   Patient was seen and examined.  Lying in the bed.  Comfortable.  Did not look in acute distress.  Patient is complaining of pain in the foot.  Patient had I&D done and glass was removed from the foot.       Objective     Physical Exam  HEENT:  Head externally atraumatic,  extraocular movements intact, oral mucosa moist  Neck:  Supple, no JVP, no palpable adenopathy or thyromegaly.  No carotid bruit.  Chest:  Clear to auscultation and resonant.  Heart:  Regular rate and rhythm, no murmur or gallop could be appreciated.  Abdomen:  Soft, nontender, bowel sounds present, normoactive, no palpable hepatosplenomegaly.  Extremities:  No edema, pulses present, no cyanosis or clubbing.  CNS:  Patient alert, oriented to time, place and person.    No new deficit.  Cranial nerves 2-12 grossly intact  Skin:  No active rash.  Musculoskeletal:  No  apparent joint swelling or erythema, range of movement normal.  Last Recorded Vitals  Heart Rate:  [64-69]   Temp:  [36.9 °C (98.4 °F)-37 °C (98.6 °F)]   Resp:  [17-18]   BP: (126-164)/(41-54)   SpO2:  [96 %-97 %]     Intake/Output last 3 Shifts:  I/O last 3 completed shifts:  In: 350 (4.1 mL/kg) [IV Piggyback:350]  Out: 450 (5.2 mL/kg) [Urine:450 (0.1 mL/kg/hr)]  Weight: 86.2 kg     Relevant Results  No results found for the last 90 days.    Results for orders placed or performed during the hospital encounter of 05/31/24 (from the past 24 hour(s))   POCT GLUCOSE   Result Value Ref Range    POCT Glucose 179 (H) 74 - 99 mg/dL   CBC   Result Value Ref Range    WBC 11.7 (H) 4.4 - 11.3 x10*3/uL    nRBC 0.0 0.0 - 0.0 /100 WBCs    RBC 3.27 (L) 4.00 - 5.20 x10*6/uL    Hemoglobin 11.1 (L) 12.0 - 16.0 g/dL    Hematocrit 32.3 (L) 36.0 - 46.0 %    MCV 99 80 - 100 fL    MCH 33.9 26.0 - 34.0 pg    MCHC 34.4 32.0 - 36.0 g/dL    RDW 12.0 11.5 - 14.5 %    Platelets 216 150  - 450 x10*3/uL   Comprehensive metabolic panel   Result Value Ref Range    Glucose 183 (H) 65 - 99 mg/dL    Sodium 133 133 - 145 mmol/L    Potassium 4.0 3.4 - 5.1 mmol/L    Chloride 96 (L) 97 - 107 mmol/L    Bicarbonate 25 24 - 31 mmol/L    Urea Nitrogen 16 8 - 25 mg/dL    Creatinine 0.90 0.40 - 1.60 mg/dL    eGFR 69 >60 mL/min/1.73m*2    Calcium 8.9 8.5 - 10.4 mg/dL    Albumin 4.0 3.5 - 5.0 g/dL    Alkaline Phosphatase 57 35 - 125 U/L    Total Protein 7.0 5.9 - 7.9 g/dL    AST 9 5 - 40 U/L    Bilirubin, Total 0.7 0.1 - 1.2 mg/dL    ALT 6 5 - 40 U/L    Anion Gap 12 <=19 mmol/L   Vancomycin   Result Value Ref Range    Vancomycin 20.7 (H) 10.0 - 20.0 ug/mL   POCT GLUCOSE   Result Value Ref Range    POCT Glucose 183 (H) 74 - 99 mg/dL   POCT GLUCOSE   Result Value Ref Range    POCT Glucose 202 (H) 74 - 99 mg/dL   POCT GLUCOSE   Result Value Ref Range    POCT Glucose 142 (H) 74 - 99 mg/dL        Current Facility-Administered Medications:     acetaminophen (Tylenol) tablet 650 mg, 650 mg, oral, q4h PRN, 650 mg at 06/01/24 1221 **OR** acetaminophen (Tylenol) oral liquid 650 mg, 650 mg, nasogastric tube, q4h PRN **OR** acetaminophen (Tylenol) suppository 650 mg, 650 mg, rectal, q4h PRN, Humaira Malave, APRN-CNP    amLODIPine (Norvasc) tablet 10 mg, 10 mg, oral, Daily, Humaira Malave, APRN-CNP, 10 mg at 06/01/24 0823    aspirin EC tablet 81 mg, 81 mg, oral, Daily, Humaira Malave, APRN-CNP, 81 mg at 06/01/24 0823    atenolol (Tenormin) tablet 50 mg, 50 mg, oral, Daily, TERI Hu-CNP, 50 mg at 05/31/24 2035    atorvastatin (Lipitor) tablet 80 mg, 80 mg, oral, Nightly, TERI Hu-CNP, 80 mg at 05/31/24 2035    chlorthalidone (Hygroton) tablet 25 mg, 25 mg, oral, Daily, TERI Hu-CNP, 25 mg at 06/01/24 0823    cilostazol (Pletal) tablet 50 mg, 50 mg, oral, BID, TERI Hu-CNP, 50 mg at 06/01/24 0824    clopidogrel (Plavix) tablet 75 mg, 75 mg, oral, Daily, Humaira Malave,  TERI-CNP, 75 mg at 06/01/24 0823    dextrose 50 % injection 12.5 g, 12.5 g, intravenous, q15 min PRN, TERI Hu-CNP    dextrose 50 % injection 25 g, 25 g, intravenous, q15 min PRN, Humaira Malave APRN-CNP    ezetimibe (Zetia) tablet 10 mg, 10 mg, oral, Daily, TERI Hu-CNP, 10 mg at 06/01/24 0823    furosemide (Lasix) tablet 20 mg, 20 mg, oral, Daily, Humaira Malave APRN-CNP, 20 mg at 06/01/24 0823    glucagon (Glucagen) injection 1 mg, 1 mg, intramuscular, q15 min PRN, TERI Hu-CNP    glucagon (Glucagen) injection 1 mg, 1 mg, intramuscular, q15 min PRN, TERI Hu-CNP    heparin (porcine) injection 5,000 Units, 5,000 Units, subcutaneous, q8h LAURO, Humaira Malave APRN-CNP, 5,000 Units at 06/01/24 1357    HYDROcodone-acetaminophen (Norco) 5-325 mg per tablet 1 tablet, 1 tablet, oral, q6h PRN, Sherwin Lang MD, 1 tablet at 06/01/24 1549    insulin lispro (HumaLOG) injection 0-15 Units, 0-15 Units, subcutaneous, TID, Sherwin Lang MD    melatonin tablet 3 mg, 3 mg, oral, Nightly PRN, TERI Hu-CNP    metFORMIN (Glucophage) tablet 1,000 mg, 1,000 mg, oral, BID, Humaira Malave APRN-CNP, 1,000 mg at 06/01/24 1700    ondansetron (Zofran) tablet 4 mg, 4 mg, oral, q8h PRN **OR** ondansetron (Zofran) injection 4 mg, 4 mg, intravenous, q8h PRN, Humaira Malave APRN-CNP    pantoprazole (ProtoNix) EC tablet 40 mg, 40 mg, oral, Daily before breakfast, TERI Hu-CNP, 40 mg at 06/01/24 0620    piperacillin-tazobactam-dextrose (Zosyn) IV 3.375 g, 3.375 g, intravenous, q6h, VIELKA Hu, Stopped at 06/01/24 1252    psyllium (Metamucil) 3.4 gram packet 1 packet, 1 packet, oral, Daily, VIELKA Hu, 1 packet at 05/31/24 2015    valsartan (Diovan) tablet 80 mg, 80 mg, oral, Daily, VIELKA Hu, 80 mg at 06/01/24 0823    vancomycin (Vancocin) pharmacy to dose - pharmacy monitoring, ,  miscellaneous, Daily PRN, VIELKA Hu    [START ON 6/2/2024] vancomycin 1.5 g in 300 mL (Xellia) IVPB 1.5 g, 1,500 mg, intravenous, q24h, VIELKA Hu   Assessment/Plan   Principal Problem:    Cellulitis and abscess of toe of right foot  Right foot cellulitis  Hypertensive urgency  Hypertension  Renal artery stenosis  Hyperlipidemia  Peripheral vascular disease  History of tobacco abuse.  Diabetes mellitus type 2  Neuropathy  History of breast cancer  Psoriasis  Plan: Continue current medication.  Supportive care.  Physical therapy and Occupational Therapy.  Monitor CBC and BMP pending trajectory monitor blood sugar q. H&H's cover the sliding scale insulin.  Monitor blood pressure.  Will take DVT, fall, aspiration, decubitus and DVT precautions.         Sherwin Lang MD

## 2024-06-02 LAB
GLUCOSE BLD MANUAL STRIP-MCNC: 114 MG/DL (ref 74–99)
GLUCOSE BLD MANUAL STRIP-MCNC: 141 MG/DL (ref 74–99)
GLUCOSE BLD MANUAL STRIP-MCNC: 171 MG/DL (ref 74–99)
GLUCOSE BLD MANUAL STRIP-MCNC: 198 MG/DL (ref 74–99)
HOLD SPECIMEN: NORMAL
VANCOMYCIN SERPL-MCNC: 28.7 UG/ML (ref 10–20)

## 2024-06-02 PROCEDURE — 2500000001 HC RX 250 WO HCPCS SELF ADMINISTERED DRUGS (ALT 637 FOR MEDICARE OP): Performed by: NURSE PRACTITIONER

## 2024-06-02 PROCEDURE — 80202 ASSAY OF VANCOMYCIN: CPT | Performed by: NURSE PRACTITIONER

## 2024-06-02 PROCEDURE — 2500000004 HC RX 250 GENERAL PHARMACY W/ HCPCS (ALT 636 FOR OP/ED): Performed by: NURSE PRACTITIONER

## 2024-06-02 PROCEDURE — 1210000001 HC SEMI-PRIVATE ROOM DAILY

## 2024-06-02 PROCEDURE — 2500000002 HC RX 250 W HCPCS SELF ADMINISTERED DRUGS (ALT 637 FOR MEDICARE OP, ALT 636 FOR OP/ED): Performed by: INTERNAL MEDICINE

## 2024-06-02 PROCEDURE — 2500000001 HC RX 250 WO HCPCS SELF ADMINISTERED DRUGS (ALT 637 FOR MEDICARE OP): Performed by: INTERNAL MEDICINE

## 2024-06-02 PROCEDURE — G0378 HOSPITAL OBSERVATION PER HR: HCPCS

## 2024-06-02 PROCEDURE — 36415 COLL VENOUS BLD VENIPUNCTURE: CPT | Performed by: NURSE PRACTITIONER

## 2024-06-02 PROCEDURE — 82947 ASSAY GLUCOSE BLOOD QUANT: CPT | Mod: 91

## 2024-06-02 PROCEDURE — 2500000002 HC RX 250 W HCPCS SELF ADMINISTERED DRUGS (ALT 637 FOR MEDICARE OP, ALT 636 FOR OP/ED): Mod: MUE | Performed by: NURSE PRACTITIONER

## 2024-06-02 RX ORDER — VANCOMYCIN 1.75 G/350ML
1250 INJECTION, SOLUTION INTRAVENOUS EVERY 24 HOURS
Status: DISCONTINUED | OUTPATIENT
Start: 2024-06-03 | End: 2024-06-03

## 2024-06-02 RX ADMIN — INSULIN LISPRO 3 UNITS: 100 INJECTION, SOLUTION INTRAVENOUS; SUBCUTANEOUS at 09:19

## 2024-06-02 RX ADMIN — PIPERACILLIN SODIUM AND TAZOBACTAM SODIUM 3.38 G: 3; .375 INJECTION, SOLUTION INTRAVENOUS at 01:48

## 2024-06-02 RX ADMIN — PIPERACILLIN SODIUM AND TAZOBACTAM SODIUM 3.38 G: 3; .375 INJECTION, SOLUTION INTRAVENOUS at 09:21

## 2024-06-02 RX ADMIN — PIPERACILLIN SODIUM AND TAZOBACTAM SODIUM 3.38 G: 3; .375 INJECTION, SOLUTION INTRAVENOUS at 14:35

## 2024-06-02 RX ADMIN — CHLORTHALIDONE 25 MG: 25 TABLET ORAL at 09:25

## 2024-06-02 RX ADMIN — PANTOPRAZOLE SODIUM 40 MG: 40 TABLET, DELAYED RELEASE ORAL at 06:35

## 2024-06-02 RX ADMIN — EZETIMIBE 10 MG: 10 TABLET ORAL at 09:21

## 2024-06-02 RX ADMIN — VALSARTAN 80 MG: 80 TABLET, FILM COATED ORAL at 09:23

## 2024-06-02 RX ADMIN — HEPARIN SODIUM 5000 UNITS: 5000 INJECTION, SOLUTION INTRAVENOUS; SUBCUTANEOUS at 14:36

## 2024-06-02 RX ADMIN — AMLODIPINE BESYLATE 10 MG: 10 TABLET ORAL at 09:22

## 2024-06-02 RX ADMIN — INSULIN LISPRO 3 UNITS: 100 INJECTION, SOLUTION INTRAVENOUS; SUBCUTANEOUS at 12:48

## 2024-06-02 RX ADMIN — METFORMIN HYDROCHLORIDE 1000 MG: 1000 TABLET ORAL at 17:30

## 2024-06-02 RX ADMIN — ATENOLOL 50 MG: 50 TABLET ORAL at 06:34

## 2024-06-02 RX ADMIN — CILOSTAZOL 50 MG: 50 TABLET ORAL at 20:17

## 2024-06-02 RX ADMIN — HEPARIN SODIUM 5000 UNITS: 5000 INJECTION, SOLUTION INTRAVENOUS; SUBCUTANEOUS at 06:34

## 2024-06-02 RX ADMIN — VANCOMYCIN 1.5 G: 1.5 INJECTION, SOLUTION INTRAVENOUS at 00:00

## 2024-06-02 RX ADMIN — CLOPIDOGREL BISULFATE 75 MG: 75 TABLET ORAL at 09:22

## 2024-06-02 RX ADMIN — HEPARIN SODIUM 5000 UNITS: 5000 INJECTION, SOLUTION INTRAVENOUS; SUBCUTANEOUS at 21:28

## 2024-06-02 RX ADMIN — CILOSTAZOL 50 MG: 50 TABLET ORAL at 09:25

## 2024-06-02 RX ADMIN — ACETAMINOPHEN 650 MG: 325 TABLET ORAL at 20:17

## 2024-06-02 RX ADMIN — METFORMIN HYDROCHLORIDE 1000 MG: 1000 TABLET ORAL at 09:22

## 2024-06-02 RX ADMIN — ASPIRIN 81 MG: 81 TABLET, COATED ORAL at 09:22

## 2024-06-02 RX ADMIN — PIPERACILLIN SODIUM AND TAZOBACTAM SODIUM 3.38 G: 3; .375 INJECTION, SOLUTION INTRAVENOUS at 20:17

## 2024-06-02 RX ADMIN — ATORVASTATIN CALCIUM 80 MG: 80 TABLET, FILM COATED ORAL at 20:17

## 2024-06-02 ASSESSMENT — COGNITIVE AND FUNCTIONAL STATUS - GENERAL
MOBILITY SCORE: 20
WALKING IN HOSPITAL ROOM: A LITTLE
CLIMB 3 TO 5 STEPS WITH RAILING: A LITTLE
DAILY ACTIVITIY SCORE: 24
MOVING TO AND FROM BED TO CHAIR: A LITTLE
DAILY ACTIVITIY SCORE: 23
DRESSING REGULAR LOWER BODY CLOTHING: A LITTLE
MOVING TO AND FROM BED TO CHAIR: A LITTLE
WALKING IN HOSPITAL ROOM: A LITTLE
STANDING UP FROM CHAIR USING ARMS: A LITTLE
STANDING UP FROM CHAIR USING ARMS: A LITTLE
CLIMB 3 TO 5 STEPS WITH RAILING: A LITTLE
MOBILITY SCORE: 20

## 2024-06-02 ASSESSMENT — PAIN - FUNCTIONAL ASSESSMENT
PAIN_FUNCTIONAL_ASSESSMENT: 0-10

## 2024-06-02 ASSESSMENT — PAIN SCALES - GENERAL
PAINLEVEL_OUTOF10: 1
PAINLEVEL_OUTOF10: 0 - NO PAIN
PAINLEVEL_OUTOF10: 3
PAINLEVEL_OUTOF10: 6

## 2024-06-02 ASSESSMENT — PAIN DESCRIPTION - LOCATION: LOCATION: FOOT

## 2024-06-02 ASSESSMENT — PAIN DESCRIPTION - ORIENTATION: ORIENTATION: RIGHT

## 2024-06-02 ASSESSMENT — PAIN DESCRIPTION - DESCRIPTORS: DESCRIPTORS: SHOOTING;SORE

## 2024-06-02 NOTE — CARE PLAN
Problem: Pain - Adult  Goal: Verbalizes/displays adequate comfort level or baseline comfort level  Outcome: Progressing     Problem: Safety - Adult  Goal: Free from fall injury  Outcome: Progressing     Problem: Discharge Planning  Goal: Discharge to home or other facility with appropriate resources  Outcome: Progressing     Problem: Chronic Conditions and Co-morbidities  Goal: Patient's chronic conditions and co-morbidity symptoms are monitored and maintained or improved  Outcome: Progressing     Problem: Skin  Goal: Decreased wound size/increased tissue granulation at next dressing change  Outcome: Progressing  Flowsheets (Taken 6/1/2024 2324)  Decreased wound size/increased tissue granulation at next dressing change:   Promote sleep for wound healing   Protective dressings over bony prominences   Utilize specialty bed per algorithm  Goal: Participates in plan/prevention/treatment measures  Outcome: Progressing  Flowsheets (Taken 6/1/2024 2324)  Participates in plan/prevention/treatment measures:   Discuss with provider PT/OT consult   Elevate heels   Increase activity/out of bed for meals  Goal: Prevent/manage excess moisture  Outcome: Progressing  Flowsheets (Taken 6/1/2024 2324)  Prevent/manage excess moisture:   Cleanse incontinence/protect with barrier cream   Follow provider orders for dressing changes   Moisturize dry skin   Monitor for/manage infection if present   Use wicking fabric (obtain order)  Goal: Prevent/minimize sheer/friction injuries  Outcome: Progressing  Flowsheets (Taken 6/1/2024 2324)  Prevent/minimize sheer/friction injuries:   Complete micro-shifts as needed if patient unable. Adjust patient position to relieve pressure points, not a full turn   HOB 30 degrees or less   Increase activity/out of bed for meals   Turn/reposition every 2 hours/use positioning/transfer devices   Use pull sheet   Utilize specialty bed per algorithm  Goal: Promote/optimize nutrition  Outcome:  Progressing  Flowsheets (Taken 6/1/2024 2324)  Promote/optimize nutrition:   Assist with feeding   Consume > 50% meals/supplements   Discuss with provider if NPO > 2 days   Monitor/record intake including meals   Offer water/supplements/favorite foods   Reassess MST if dietician not consulted  Goal: Promote skin healing  Outcome: Progressing  Flowsheets (Taken 6/1/2024 2324)  Promote skin healing:   Assess skin/pad under line(s)/device(s)   Ensure correct size (line/device) and apply per  instructions   Protective dressings over bony prominences   Rotate device position/do not position patient on device   Turn/reposition every 2 hours/use positioning/transfer devices     Problem: Pain  Goal: LTG - Patient will manage pain with the appropriate technique/Intervention  Outcome: Progressing   The patient's goals for the shift include decrease pain    The clinical goals for the shift include Comfort and safety    Over the shift, the patient did not make progress toward the following goals. Barriers to progression include . Recommendations to address these barriers include .

## 2024-06-02 NOTE — PROGRESS NOTES
Vancomycin Dosing by Pharmacy- FOLLOW UP    Lottie Rodríguez is a 68 y.o. year old female who Pharmacy has been consulted for vancomycin dosing for cellulitis, skin and soft tissue. Based on the patient's indication and renal status this patient is being dosed based on a goal AUC of 400-600.     Renal function is currently stable.    Current vancomycin dose: 1500 mg given every 24 hours    Estimated vancomycin AUC on current dose: 621 mg/L.hr     Visit Vitals  /52 (BP Location: Left arm, Patient Position: Lying)   Pulse 71   Temp 37 °C (98.6 °F) (Oral)   Resp 18        Lab Results   Component Value Date    CREATININE 0.90 2024    CREATININE 0.80 2024    CREATININE 0.80 2024    CREATININE 1.40 2024        Patient weight is as follows:   Vitals:    24 1253   Weight: 86.2 kg (190 lb)       Cultures:  No results found for the encounter in last 14 days.       I/O last 3 completed shifts:  In: 350 (4.1 mL/kg) [IV Piggyback:350]  Out: 450 (5.2 mL/kg) [Urine:450 (0.1 mL/kg/hr)]  Weight: 86.2 kg   I/O during current shift:  No intake/output data recorded.    Temp (24hrs), Av.1 °C (98.8 °F), Min:37 °C (98.6 °F), Max:37.3 °C (99.1 °F)      Assessment/Plan    Above goal AUC. Orders placed for new vancomcyin regimen of 1250 mg every 24 hours to begin at 0000 6/3.    This dosing regimen is predicted by [employer] to result in the following pharmacokinetic parameters:  Loading dose: N/A  Regimen: 1250 mg IV every 24 hours.  Start time: 00:00 on 2024  Exposure target: AUC24 (range)400-600 mg/L.hr   AUC24,ss: 523 mg/L.hr  Probability of AUC24 > 400: 94 %  Ctrough,ss: 14.4 mg/L  Probability of Ctrough,ss > 20: 11 %  Probability of nephrotoxicity (Lodise VIDYA ): 10 %    The next level will be obtained on 6/3 at 0500. May be obtained sooner if clinically indicated.   Will continue to monitor renal function daily while on vancomycin and order serum creatinine at least every 48 hours if not  already ordered.  Follow for continued vancomycin needs, clinical response, and signs/symptoms of toxicity.       Sandra Blount, PharmD

## 2024-06-02 NOTE — PROGRESS NOTES
Lottie Rodríguez is a 68 y.o. female on day 2 of admission presenting with Cellulitis and abscess of toe of right foot.    Subjective   The patient was seen and examined.  Lying in the bed.  Comfortable.  Did not in acute distress.  Patient denies any headache or dizziness.  Pain is still present but better than before.       Objective     Physical Exam  HEENT:  Head externally atraumatic,  extraocular movements intact, oral mucosa moist  Neck:  Supple, no JVP, no palpable adenopathy or thyromegaly.  No carotid bruit.  Chest:  Clear to auscultation and resonant.  Heart:  Regular rate and rhythm, no murmur or gallop could be appreciated.  Abdomen:  Soft, nontender, bowel sounds present, normoactive, no palpable hepatosplenomegaly.  Extremities:  No edema, pulses present, no cyanosis or clubbing.  CNS:  Patient alert, oriented to time, place and person.    No new deficit.  Cranial nerves 2-12 grossly intact  Skin:  No active rash.  Musculoskeletal:  No  apparent joint swelling or erythema, range of movement normal.  Last Recorded Vitals  Heart Rate:  [64-82]   Temp:  [37 °C (98.6 °F)-37.3 °C (99.1 °F)]   Resp:  [17-18]   BP: (124-157)/(51-54)   SpO2:  [96 %-98 %]     Intake/Output last 3 Shifts:  I/O last 3 completed shifts:  In: 350 (4.1 mL/kg) [IV Piggyback:350]  Out: 450 (5.2 mL/kg) [Urine:450 (0.1 mL/kg/hr)]  Weight: 86.2 kg     Relevant Results  Susceptibility data from last 90 days.  Collected Specimen Info Organism   06/01/24 Tissue/Biopsy from Wound/Tissue Staphylococcus aureus     Results for orders placed or performed during the hospital encounter of 05/31/24 (from the past 24 hour(s))   POCT GLUCOSE   Result Value Ref Range    POCT Glucose 142 (H) 74 - 99 mg/dL   POCT GLUCOSE   Result Value Ref Range    POCT Glucose 171 (H) 74 - 99 mg/dL   Vancomycin   Result Value Ref Range    Vancomycin 28.7 (H) 10.0 - 20.0 ug/mL   Lavender Top   Result Value Ref Range    Extra Tube Hold for add-ons.    POCT GLUCOSE   Result  Value Ref Range    POCT Glucose 198 (H) 74 - 99 mg/dL   POCT GLUCOSE   Result Value Ref Range    POCT Glucose 171 (H) 74 - 99 mg/dL        Current Facility-Administered Medications:     acetaminophen (Tylenol) tablet 650 mg, 650 mg, oral, q4h PRN, 650 mg at 06/01/24 2048 **OR** acetaminophen (Tylenol) oral liquid 650 mg, 650 mg, nasogastric tube, q4h PRN **OR** acetaminophen (Tylenol) suppository 650 mg, 650 mg, rectal, q4h PRN, Humaira Malave, APRN-CNP    amLODIPine (Norvasc) tablet 10 mg, 10 mg, oral, Daily, Humaira Malave APRN-CNP, 10 mg at 06/02/24 0922    aspirin EC tablet 81 mg, 81 mg, oral, Daily, Humaira Malave, APRN-CNP, 81 mg at 06/02/24 0922    atenolol (Tenormin) tablet 50 mg, 50 mg, oral, Daily, Humaira Malave APRN-CNP, 50 mg at 06/02/24 0634    atorvastatin (Lipitor) tablet 80 mg, 80 mg, oral, Nightly, Humaira Malave, APRN-CNP, 80 mg at 06/01/24 2048    chlorthalidone (Hygroton) tablet 25 mg, 25 mg, oral, Daily, Humaira Malave, APRN-CNP, 25 mg at 06/02/24 0925    cilostazol (Pletal) tablet 50 mg, 50 mg, oral, BID, Humaira Malave APRN-CNP, 50 mg at 06/02/24 0925    clopidogrel (Plavix) tablet 75 mg, 75 mg, oral, Daily, Humaira Malave, APRN-CNP, 75 mg at 06/02/24 0922    dextrose 50 % injection 12.5 g, 12.5 g, intravenous, q15 min PRN, Humaira Malave, APRN-CNP    dextrose 50 % injection 25 g, 25 g, intravenous, q15 min PRN, Humaira Malave, APRN-CNP    ezetimibe (Zetia) tablet 10 mg, 10 mg, oral, Daily, Humaira Malave, APRN-CNP, 10 mg at 06/02/24 0921    furosemide (Lasix) tablet 20 mg, 20 mg, oral, Daily, TERI Hu-CNP, 20 mg at 06/01/24 0823    glucagon (Glucagen) injection 1 mg, 1 mg, intramuscular, q15 min PRN, VIELKA Hu    glucagon (Glucagen) injection 1 mg, 1 mg, intramuscular, q15 min PRN, VIELKA Hu    heparin (porcine) injection 5,000 Units, 5,000 Units, subcutaneous, q8h LAURO, Humaira Malave APRN-CNP, 5,000 Units at  06/02/24 0634    HYDROcodone-acetaminophen (Norco) 5-325 mg per tablet 1 tablet, 1 tablet, oral, q6h PRN, Sherwin Lang MD, 1 tablet at 06/01/24 1549    insulin lispro (HumaLOG) injection 0-15 Units, 0-15 Units, subcutaneous, TID, Sherwin Lang MD, 3 Units at 06/02/24 1248    melatonin tablet 3 mg, 3 mg, oral, Nightly PRN, VIELKA Hu    metFORMIN (Glucophage) tablet 1,000 mg, 1,000 mg, oral, BID, TERI Hu-CNP, 1,000 mg at 06/02/24 0922    ondansetron (Zofran) tablet 4 mg, 4 mg, oral, q8h PRN **OR** ondansetron (Zofran) injection 4 mg, 4 mg, intravenous, q8h PRN, VIELKA Hu    pantoprazole (ProtoNix) EC tablet 40 mg, 40 mg, oral, Daily before breakfast, TERI Hu-CNP, 40 mg at 06/02/24 0635    piperacillin-tazobactam-dextrose (Zosyn) IV 3.375 g, 3.375 g, intravenous, q6h, TERI Hu-CNP, Stopped at 06/02/24 0951    psyllium (Metamucil) 3.4 gram packet 1 packet, 1 packet, oral, Daily, VIELKA Hu, 1 packet at 05/31/24 2015    valsartan (Diovan) tablet 80 mg, 80 mg, oral, Daily, TERI Hu-CNP, 80 mg at 06/02/24 0923    vancomycin (Vancocin) pharmacy to dose - pharmacy monitoring, , miscellaneous, Daily PRN, VIELKA Hu    [START ON 6/3/2024] vancomycin-diluent combo no.1 (Xellia) IVPB 1,250 mg, 1,250 mg, intravenous, q24h, TERI Hu-HAO   Assessment/Plan   Principal Problem:    Cellulitis and abscess of toe of right foot  Foreign body in the foot removed  Leukocytosis  Anemia  Diabetes mellitus type 2  Diabetic neuropathy  Diabetes mellitus type 2  Triglyceridemia    Plan: Continue current medication.  Supportive care.  Give physical therapy and Occupational Therapy.  Monitor CBC and BMP.  Give physical therapy and Occupational Therapy.  Monitor blood sugar.  Control pain with narcotic analgesics.  Wound culture is growing Staph aureus.  Final culture report pending.  Blood sugars  fairly controlled.  We will take DVT, fall, aspiration, decubitus and DVT precautions.  This has been discussed with the patient and is agreeable to it.         Sherwin Lang MD

## 2024-06-02 NOTE — ED NOTES
BSSR given and patient is in bed with  and changed right foot dressing with betadine and gauze and kerlex. Call light and possessions within reach.

## 2024-06-02 NOTE — NURSING NOTE
Assumed care of pt. BSSR completed. Pt observed lying in bed, awake, A&O x3. Respiration regular and unlabored on room air. Dressing to right foot clean,dry,intact. Pt's spouse at bedside. No concerns or needs voiced at this time. Call light within reach. Bed in lowest position, locked. Bed alarm activated. Will continue current plan of care and update as necessary.

## 2024-06-02 NOTE — NURSING NOTE
No change from previous assessment. Pt resting comfortably in bed with call light in reach, no new events/concerns this shift.  All safety measures maintained.

## 2024-06-03 VITALS
SYSTOLIC BLOOD PRESSURE: 141 MMHG | DIASTOLIC BLOOD PRESSURE: 45 MMHG | HEIGHT: 66 IN | RESPIRATION RATE: 16 BRPM | HEART RATE: 65 BPM | OXYGEN SATURATION: 94 % | BODY MASS INDEX: 30.53 KG/M2 | TEMPERATURE: 98.4 F | WEIGHT: 190 LBS

## 2024-06-03 LAB
ANION GAP SERPL CALC-SCNC: 17 MMOL/L
BASOPHILS # BLD AUTO: 0.05 X10*3/UL (ref 0–0.1)
BASOPHILS NFR BLD AUTO: 0.6 %
BUN SERPL-MCNC: 27 MG/DL (ref 8–25)
CALCIUM SERPL-MCNC: 9 MG/DL (ref 8.5–10.4)
CHLORIDE SERPL-SCNC: 99 MMOL/L (ref 97–107)
CO2 SERPL-SCNC: 20 MMOL/L (ref 24–31)
CREAT SERPL-MCNC: 1.2 MG/DL (ref 0.4–1.6)
EGFRCR SERPLBLD CKD-EPI 2021: 49 ML/MIN/1.73M*2
EOSINOPHIL # BLD AUTO: 0.11 X10*3/UL (ref 0–0.7)
EOSINOPHIL NFR BLD AUTO: 1.3 %
ERYTHROCYTE [DISTWIDTH] IN BLOOD BY AUTOMATED COUNT: 12 % (ref 11.5–14.5)
FERRITIN SERPL-MCNC: 655 NG/ML (ref 13–150)
FOLATE SERPL-MCNC: 8.9 NG/ML (ref 4.2–19.9)
GLUCOSE BLD MANUAL STRIP-MCNC: 176 MG/DL (ref 74–99)
GLUCOSE BLD MANUAL STRIP-MCNC: 204 MG/DL (ref 74–99)
GLUCOSE SERPL-MCNC: 131 MG/DL (ref 65–99)
HCT VFR BLD AUTO: 30.9 % (ref 36–46)
HGB BLD-MCNC: 10.3 G/DL (ref 12–16)
IMM GRANULOCYTES # BLD AUTO: 0.08 X10*3/UL (ref 0–0.7)
IMM GRANULOCYTES NFR BLD AUTO: 0.9 % (ref 0–0.9)
IRON SATN MFR SERPL: 10 % (ref 12–50)
IRON SERPL-MCNC: 20 UG/DL (ref 30–160)
LYMPHOCYTES # BLD AUTO: 1.18 X10*3/UL (ref 1.2–4.8)
LYMPHOCYTES NFR BLD AUTO: 13.9 %
MCH RBC QN AUTO: 34 PG (ref 26–34)
MCHC RBC AUTO-ENTMCNC: 33.3 G/DL (ref 32–36)
MCV RBC AUTO: 102 FL (ref 80–100)
MONOCYTES # BLD AUTO: 0.71 X10*3/UL (ref 0.1–1)
MONOCYTES NFR BLD AUTO: 8.4 %
NEUTROPHILS # BLD AUTO: 6.35 X10*3/UL (ref 1.2–7.7)
NEUTROPHILS NFR BLD AUTO: 74.9 %
NRBC BLD-RTO: 0 /100 WBCS (ref 0–0)
PLATELET # BLD AUTO: 208 X10*3/UL (ref 150–450)
POTASSIUM SERPL-SCNC: 3.7 MMOL/L (ref 3.4–5.1)
RBC # BLD AUTO: 3.03 X10*6/UL (ref 4–5.2)
SODIUM SERPL-SCNC: 136 MMOL/L (ref 133–145)
TIBC SERPL-MCNC: 208 UG/DL (ref 228–428)
UIBC SERPL-MCNC: 188 UG/DL (ref 110–370)
VANCOMYCIN SERPL-MCNC: 31.6 UG/ML (ref 10–20)
VIT B12 SERPL-MCNC: 157 PG/ML (ref 211–946)
WBC # BLD AUTO: 8.5 X10*3/UL (ref 4.4–11.3)

## 2024-06-03 PROCEDURE — 2500000002 HC RX 250 W HCPCS SELF ADMINISTERED DRUGS (ALT 637 FOR MEDICARE OP, ALT 636 FOR OP/ED): Performed by: NURSE PRACTITIONER

## 2024-06-03 PROCEDURE — 83540 ASSAY OF IRON: CPT | Performed by: NURSE PRACTITIONER

## 2024-06-03 PROCEDURE — 82746 ASSAY OF FOLIC ACID SERUM: CPT | Performed by: NURSE PRACTITIONER

## 2024-06-03 PROCEDURE — G0378 HOSPITAL OBSERVATION PER HR: HCPCS

## 2024-06-03 PROCEDURE — 2500000004 HC RX 250 GENERAL PHARMACY W/ HCPCS (ALT 636 FOR OP/ED): Performed by: NURSE PRACTITIONER

## 2024-06-03 PROCEDURE — 36415 COLL VENOUS BLD VENIPUNCTURE: CPT | Performed by: INTERNAL MEDICINE

## 2024-06-03 PROCEDURE — 82728 ASSAY OF FERRITIN: CPT | Performed by: NURSE PRACTITIONER

## 2024-06-03 PROCEDURE — 82947 ASSAY GLUCOSE BLOOD QUANT: CPT | Mod: 91

## 2024-06-03 PROCEDURE — 82607 VITAMIN B-12: CPT | Performed by: NURSE PRACTITIONER

## 2024-06-03 PROCEDURE — 82947 ASSAY GLUCOSE BLOOD QUANT: CPT

## 2024-06-03 PROCEDURE — 2500000001 HC RX 250 WO HCPCS SELF ADMINISTERED DRUGS (ALT 637 FOR MEDICARE OP): Performed by: NURSE PRACTITIONER

## 2024-06-03 PROCEDURE — 85025 COMPLETE CBC W/AUTO DIFF WBC: CPT | Performed by: INTERNAL MEDICINE

## 2024-06-03 PROCEDURE — 80048 BASIC METABOLIC PNL TOTAL CA: CPT | Performed by: INTERNAL MEDICINE

## 2024-06-03 PROCEDURE — 80202 ASSAY OF VANCOMYCIN: CPT | Performed by: NURSE PRACTITIONER

## 2024-06-03 PROCEDURE — 82374 ASSAY BLOOD CARBON DIOXIDE: CPT | Performed by: INTERNAL MEDICINE

## 2024-06-03 RX ORDER — CEPHALEXIN 500 MG/1
500 CAPSULE ORAL EVERY 6 HOURS
Qty: 28 CAPSULE | Refills: 0 | Status: SHIPPED | OUTPATIENT
Start: 2024-06-03 | End: 2024-06-10

## 2024-06-03 RX ORDER — FUROSEMIDE 20 MG/1
20 TABLET ORAL DAILY
Start: 2024-06-06

## 2024-06-03 RX ORDER — CEPHALEXIN 500 MG/1
500 CAPSULE ORAL EVERY 6 HOURS
Status: DISCONTINUED | OUTPATIENT
Start: 2024-06-03 | End: 2024-06-03 | Stop reason: HOSPADM

## 2024-06-03 RX ORDER — VANCOMYCIN 1 G/200ML
1000 INJECTION, SOLUTION INTRAVENOUS EVERY 24 HOURS
Status: DISCONTINUED | OUTPATIENT
Start: 2024-06-04 | End: 2024-06-03

## 2024-06-03 RX ADMIN — AMLODIPINE BESYLATE 10 MG: 10 TABLET ORAL at 09:02

## 2024-06-03 RX ADMIN — ATENOLOL 50 MG: 50 TABLET ORAL at 06:27

## 2024-06-03 RX ADMIN — CLOPIDOGREL BISULFATE 75 MG: 75 TABLET ORAL at 09:02

## 2024-06-03 RX ADMIN — ASPIRIN 81 MG: 81 TABLET, COATED ORAL at 09:02

## 2024-06-03 RX ADMIN — VANCOMYCIN 1250 MG: 1.75 INJECTION, SOLUTION INTRAVENOUS at 00:06

## 2024-06-03 RX ADMIN — PIPERACILLIN SODIUM AND TAZOBACTAM SODIUM 3.38 G: 3; .375 INJECTION, SOLUTION INTRAVENOUS at 12:18

## 2024-06-03 RX ADMIN — INSULIN LISPRO 6 UNITS: 100 INJECTION, SOLUTION INTRAVENOUS; SUBCUTANEOUS at 09:01

## 2024-06-03 RX ADMIN — INSULIN LISPRO 3 UNITS: 100 INJECTION, SOLUTION INTRAVENOUS; SUBCUTANEOUS at 12:16

## 2024-06-03 RX ADMIN — PIPERACILLIN SODIUM AND TAZOBACTAM SODIUM 3.38 G: 3; .375 INJECTION, SOLUTION INTRAVENOUS at 06:27

## 2024-06-03 RX ADMIN — HEPARIN SODIUM 5000 UNITS: 5000 INJECTION, SOLUTION INTRAVENOUS; SUBCUTANEOUS at 06:27

## 2024-06-03 RX ADMIN — PIPERACILLIN SODIUM AND TAZOBACTAM SODIUM 3.38 G: 3; .375 INJECTION, SOLUTION INTRAVENOUS at 01:29

## 2024-06-03 RX ADMIN — METFORMIN HYDROCHLORIDE 1000 MG: 1000 TABLET ORAL at 09:02

## 2024-06-03 RX ADMIN — CILOSTAZOL 50 MG: 50 TABLET ORAL at 09:04

## 2024-06-03 RX ADMIN — PANTOPRAZOLE SODIUM 40 MG: 40 TABLET, DELAYED RELEASE ORAL at 06:27

## 2024-06-03 RX ADMIN — EZETIMIBE 10 MG: 10 TABLET ORAL at 09:02

## 2024-06-03 ASSESSMENT — COGNITIVE AND FUNCTIONAL STATUS - GENERAL
DAILY ACTIVITIY SCORE: 23
WALKING IN HOSPITAL ROOM: A LITTLE
DAILY ACTIVITIY SCORE: 24
DRESSING REGULAR LOWER BODY CLOTHING: A LITTLE
MOBILITY SCORE: 22
CLIMB 3 TO 5 STEPS WITH RAILING: A LITTLE

## 2024-06-03 ASSESSMENT — PAIN - FUNCTIONAL ASSESSMENT
PAIN_FUNCTIONAL_ASSESSMENT: 0-10
PAIN_FUNCTIONAL_ASSESSMENT: 0-10

## 2024-06-03 ASSESSMENT — PAIN SCALES - GENERAL
PAINLEVEL_OUTOF10: 0 - NO PAIN
PAINLEVEL_OUTOF10: 0 - NO PAIN

## 2024-06-03 NOTE — NURSING NOTE
Pt educated to have follow up blood work in 1 week, reprinted new AVS and gave to patient. Transported to main lobby in stable condition with all personal belongings and  at side.

## 2024-06-03 NOTE — PROGRESS NOTES
Occupational Therapy                 Therapy Communication Note    Patient Name: Lottie Rodríguez  MRN: [id number]  Today's Date: 6/3/2024     Discipline: Occupational Therapy    Missed Visit Reason: Missed Visit Reason: Other (Pt is independent in ADLs and functional mobility, denies acute OT needs, all goals met. Reports discharging home today, will D/C OT.)    Discharge OT no further needs

## 2024-06-03 NOTE — CARE PLAN
Per todays notes by Dr. Trejo; pt will go home with PO cephalexin 500mg QID for 7 days; she will follow up with Dr. Funes next week  14:28 Pt signed IM letter at 14:07; pt said that she is ready to go.  at bedside.    DISCHARGE PLAN: HOME WITH SHONDA

## 2024-06-03 NOTE — RESEARCH NOTES
Artificial Intelligence Monitoring in Nursing (AIMS Nursing) Study    Principle Investigator - Dr. Olu Lambert  Research Coordinator - Bartolome Peter     Patient Name - Lottie Rodríguez  Date - 6/3/2024 1:32 PM  Location - [facility]    Lottie Rodríguez was not approached by Bartolome Peter to talk about participating in the AIMS Nursing Study. The patient was not able to be approached, a research coordinator will come back at a later time.      Bartolome Peter

## 2024-06-03 NOTE — CARE PLAN
Problem: Pain - Adult  Goal: Verbalizes/displays adequate comfort level or baseline comfort level  6/2/2024 2349 by Jacqueline Chaves RN  Outcome: Progressing  6/2/2024 2348 by Jacqueline Chaves RN  Outcome: Progressing  6/2/2024 2334 by Jacqueline Chaves RN  Outcome: Progressing     Problem: Safety - Adult  Goal: Free from fall injury  6/2/2024 2349 by Jacqueline Chaves RN  Outcome: Progressing  6/2/2024 2348 by Jacqueline Chaves RN  Outcome: Progressing  6/2/2024 2334 by Jacqueline Chaves RN  Outcome: Progressing     Problem: Discharge Planning  Goal: Discharge to home or other facility with appropriate resources  6/2/2024 2349 by Jacqueline Chaves RN  Outcome: Progressing  6/2/2024 2348 by Jacqueline Chaves RN  Outcome: Progressing  6/2/2024 2334 by Jacqueline Chaves RN  Outcome: Progressing     Problem: Chronic Conditions and Co-morbidities  Goal: Patient's chronic conditions and co-morbidity symptoms are monitored and maintained or improved  6/2/2024 2349 by Jacqueline Chaves RN  Outcome: Progressing  6/2/2024 2348 by Jacqueline Chaves RN  Outcome: Progressing  6/2/2024 2334 by Jcaqueline Chaves RN  Outcome: Progressing     Problem: Skin  Goal: Decreased wound size/increased tissue granulation at next dressing change  6/2/2024 2349 by Jacqueline Chaves RN  Outcome: Progressing  Flowsheets (Taken 6/2/2024 2349)  Decreased wound size/increased tissue granulation at next dressing change:   Promote sleep for wound healing   Protective dressings over bony prominences   Utilize specialty bed per algorithm  6/2/2024 2348 by Jacqueline Chaves RN  Outcome: Progressing  Flowsheets (Taken 6/2/2024 2098)  Decreased wound size/increased tissue granulation at next dressing change:   Promote sleep for wound healing   Protective dressings over  bony prominences   Utilize specialty bed per algorithm  6/2/2024 2334 by Jacqueline Chaves RN  Outcome: Progressing  Flowsheets (Taken 6/2/2024 2334)  Decreased wound size/increased tissue granulation at next dressing change:   Promote sleep for wound healing   Protective dressings over bony prominences   Utilize specialty bed per algorithm  Goal: Participates in plan/prevention/treatment measures  6/2/2024 2349 by Jacqueline Chaves RN  Outcome: Progressing  Flowsheets (Taken 6/2/2024 2349)  Participates in plan/prevention/treatment measures:   Discuss with provider PT/OT consult   Elevate heels   Increase activity/out of bed for meals  6/2/2024 2348 by Jacqueline Chaves RN  Outcome: Progressing  Flowsheets (Taken 6/2/2024 2348)  Participates in plan/prevention/treatment measures:   Discuss with provider PT/OT consult   Elevate heels   Increase activity/out of bed for meals  6/2/2024 2334 by Jacqueline Chaves RN  Outcome: Progressing  Flowsheets (Taken 6/2/2024 2334)  Participates in plan/prevention/treatment measures:   Discuss with provider PT/OT consult   Elevate heels   Increase activity/out of bed for meals  Goal: Prevent/manage excess moisture  6/2/2024 2349 by Jacqueline Chaves RN  Outcome: Progressing  Flowsheets (Taken 6/2/2024 2349)  Prevent/manage excess moisture:   Cleanse incontinence/protect with barrier cream   Follow provider orders for dressing changes   Moisturize dry skin   Monitor for/manage infection if present   Use wicking fabric (obtain order)  6/2/2024 2348 by Jacqueline Chaves RN  Outcome: Progressing  Flowsheets (Taken 6/2/2024 2348)  Prevent/manage excess moisture:   Cleanse incontinence/protect with barrier cream   Follow provider orders for dressing changes   Moisturize dry skin   Monitor for/manage infection if present   Use wicking fabric (obtain order)  6/2/2024 2334 by Jacqueline Chaves  RN  Outcome: Progressing  Flowsheets (Taken 6/2/2024 2334)  Prevent/manage excess moisture:   Cleanse incontinence/protect with barrier cream   Follow provider orders for dressing changes   Moisturize dry skin   Monitor for/manage infection if present   Use wicking fabric (obtain order)  Goal: Prevent/minimize sheer/friction injuries  6/2/2024 2349 by Jacqueline Chaves RN  Outcome: Progressing  Flowsheets (Taken 6/2/2024 2349)  Prevent/minimize sheer/friction injuries:   Complete micro-shifts as needed if patient unable. Adjust patient position to relieve pressure points, not a full turn   HOB 30 degrees or less   Increase activity/out of bed for meals   Turn/reposition every 2 hours/use positioning/transfer devices   Use pull sheet   Utilize specialty bed per algorithm  6/2/2024 2348 by Jacqueline Chaves RN  Outcome: Progressing  Flowsheets (Taken 6/2/2024 2348)  Prevent/minimize sheer/friction injuries:   Complete micro-shifts as needed if patient unable. Adjust patient position to relieve pressure points, not a full turn   HOB 30 degrees or less   Increase activity/out of bed for meals   Turn/reposition every 2 hours/use positioning/transfer devices   Use pull sheet   Utilize specialty bed per algorithm  6/2/2024 2334 by Jacqueline Chaves RN  Outcome: Progressing  Flowsheets (Taken 6/2/2024 2334)  Prevent/minimize sheer/friction injuries:   Complete micro-shifts as needed if patient unable. Adjust patient position to relieve pressure points, not a full turn   HOB 30 degrees or less   Increase activity/out of bed for meals   Turn/reposition every 2 hours/use positioning/transfer devices   Use pull sheet   Utilize specialty bed per algorithm  Goal: Promote/optimize nutrition  6/2/2024 2349 by Jacqueline Chaves RN  Outcome: Progressing  Flowsheets (Taken 6/2/2024 2349)  Promote/optimize nutrition:   Assist with feeding   Consume > 50% meals/supplements   Discuss  with provider if NPO > 2 days   Monitor/record intake including meals   Offer water/supplements/favorite foods   Reassess MST if dietician not consulted  6/2/2024 2348 by Jacqueline Chaves RN  Outcome: Progressing  Flowsheets (Taken 6/2/2024 2348)  Promote/optimize nutrition:   Assist with feeding   Consume > 50% meals/supplements   Discuss with provider if NPO > 2 days   Offer water/supplements/favorite foods   Monitor/record intake including meals   Reassess MST if dietician not consulted  6/2/2024 2334 by Jacqueline Chaves RN  Outcome: Progressing  Flowsheets (Taken 6/2/2024 2334)  Promote/optimize nutrition:   Assist with feeding   Consume > 50% meals/supplements   Discuss with provider if NPO > 2 days   Monitor/record intake including meals   Offer water/supplements/favorite foods   Reassess MST if dietician not consulted  Goal: Promote skin healing  6/2/2024 2349 by Jacqueline Chaves RN  Outcome: Progressing  Flowsheets (Taken 6/2/2024 2349)  Promote skin healing:   Assess skin/pad under line(s)/device(s)   Ensure correct size (line/device) and apply per  instructions   Protective dressings over bony prominences   Rotate device position/do not position patient on device   Turn/reposition every 2 hours/use positioning/transfer devices  6/2/2024 2348 by Jacqueline Chaves RN  Outcome: Progressing  Flowsheets (Taken 6/2/2024 2348)  Promote skin healing:   Assess skin/pad under line(s)/device(s)   Ensure correct size (line/device) and apply per  instructions   Protective dressings over bony prominences   Rotate device position/do not position patient on device   Turn/reposition every 2 hours/use positioning/transfer devices  6/2/2024 2334 by Jacqueline Chaves RN  Outcome: Progressing  Flowsheets (Taken 6/2/2024 2334)  Promote skin healing:   Assess skin/pad under line(s)/device(s)   Ensure correct size (line/device) and apply  per  instructions   Protective dressings over bony prominences   Rotate device position/do not position patient on device   Turn/reposition every 2 hours/use positioning/transfer devices     Problem: Pain  Goal: LTG - Patient will manage pain with the appropriate technique/Intervention  6/2/2024 2349 by Jacqueline Chaves RN  Outcome: Progressing  6/2/2024 2348 by Jacqueline Chaves RN  Outcome: Progressing  6/2/2024 2334 by Jacqueline Chaves RN  Outcome: Progressing   The patient's goals for the shift include decrease pain    The clinical goals for the shift include no falls during this shift, pain management    Over the shift, the patient did not make progress toward the following goals. Barriers to progression include . Recommendations to address these barriers include .

## 2024-06-03 NOTE — PROGRESS NOTES
Lottie Rodríguez is a 68 y.o. female on day 3 of admission presenting with Cellulitis and abscess of toe of right foot.    Subjective   68-year-old female was seen today at bedside 3 days status post removal of foreign body right foot.  Patient is progressing very well.  Her pain and swelling on the right foot has improved significantly.  Her white blood cell count has reduced and is now in the normal range.  She had her dressing changed yesterday.  She still has some tenderness on the plantar aspect of her right foot with weightbearing.  Denies constitutional symptoms.  No other pedal complaints       Physical Exam    Objective     REVIEW OF SYSTEMS  GENERAL:  Negative for malaise, significant weight loss, fever  HEENT:  No changes in hearing or vision, no nose bleeds or other nasal problems and Negative for frequent or significant headaches  NECK:  Negative for lumps, goiter, pain and significant neck swelling  RESPIRATORY:  Negative for cough, wheezing and shortness of breath  CARDIOVASCULAR:  Negative for chest pain, leg swelling and palpitations  GI:  Negative for abdominal discomfort, blood in stools or black stools and change in bowel habits  :  Negative for dysuria, frequency and incontinence  MUSCULOSKELETAL:  Negative for joint pain or swelling, back pain, and muscle pain.  SKIN:  Negative for lesions, rash, and itching  PSYCH:  Negative for sleep disturbance, mood disorder and recent psychosocial stressors  HEMATOLOGY/LYMPHOLOGY:  Negative for prolonged bleeding, bruising easily, and swollen nodes.  ENDOCRINE:  Negative for cold or heat intolerance, polyuria, polydipsia and goiter  NEURO: Negative, denies any burning, tingling or numbness     Objective:   Vasc: DP and PT pulses are palpable bilateral.  CFT is less than 3 seconds bilateral.  Skin temperature is warm to cool proximal to distal bilateral.      Neuro:  Light touch is intact to the foot bilateral.  Protective sensation is intact to the foot  "when tested with the 5.07 SWM bilateral.  There is no clonus noted.  The hallux is downgoing bilateral.      Derm: Erythema and edema around the first metatarsal phalangeal joint has decreased.  There was no soft tissue crepitation.  No purulent discharge was expressed on today's visit at bedside.  No proximal streaking.    Ortho: Muscle strength is 5/5 for all pedal groups tested.  Ankle joint, subtalar joint, 1st MPJ and lesser MPJ ROM is full and without pain or crepitus.  The foot type is rectus bilateral off weight bearing.  There are no structural deformities noted.      Last Recorded Vitals  Blood pressure (!) 147/46, pulse 67, temperature 36.9 °C (98.4 °F), temperature source Oral, resp. rate 16, height 1.676 m (5' 6\"), weight 86.2 kg (190 lb), SpO2 94%.    Intake/Output last 3 Shifts:  I/O last 3 completed shifts:  In: 740 (8.6 mL/kg) [P.O.:240; IV Piggyback:500]  Out: - (0 mL/kg)   Weight: 86.2 kg     Relevant Results           Assessment/Plan   Principal Problem:    Cellulitis and abscess of toe of right foot    Patient was seen today at bedside.  Abscess of right foot is improving significantly.  I changed her dressing today at bedside.  I am okay with the patient being discharged home on oral antibiotics based on cultures.  Patient may be weightbearing as tolerated with surgical shoe.  Patient was recommended to follow-up with me next week outpatient.  Recommended that she change her dressing every 3 days with gauze.  He agrees with plan and wishes to proceed.  I am signing off on patient       Juwan Funes, DPM      "

## 2024-06-03 NOTE — CONSULTS
Vancomycin Dosing by Pharmacy- Cessation of Therapy    Consult to pharmacy for vancomycin dosing has been discontinued by the prescriber, pharmacy will sign off at this time.    Please call pharmacy if there are further questions or re-enter a consult if vancomycin is resumed.     Tae Blanca, KamD

## 2024-06-03 NOTE — NURSING NOTE
pt verbalizes understanding of medications, next dose due, where to  medications and side effects of meds.  Pt verbalizes understanding of follow up appointments and when and how to schedule these.  Pt understands antibiotic therapy and taking meds on time at same time every day for effective treatment.  Pt verbalizes understanding of how and how often to change wound, s/s of infection and s/s to call MD for. Pt to wear surgical shoe with ambulation and understands.  Pt verbalizes understanding of all components of discharge instructions.  PIV removed.

## 2024-06-03 NOTE — NURSING NOTE
Assumed care of pt. BSSR completed. Pt observed lying in bed, awake, A&O x3. Respiration regular and unlabored on room air. Dressing to right foot clean,dry,intact. Pt's spouse at bedside. No concerns or needs voiced at this time. Call light within reach. Bed in lowest position, locked. Bed alarm activated. Will continue current plan of care and update as necessary

## 2024-06-03 NOTE — PROGRESS NOTES
Lottie Rodríguez is a 68 y.o. female on day 3 of admission presenting with Cellulitis and abscess of toe of right foot.    Subjective   Patient seen and examined.  Resting in bed in no acute distress.  Awake alert and oriented x 3.  Right foot pain 2/10, worse with touch.  No fever, chills or sweats.    Objective     Physical Exam  Vitals and nursing note reviewed.   Constitutional:       General: She is not in acute distress.     Appearance: Normal appearance. She is obese. She is not ill-appearing, toxic-appearing or diaphoretic.   HENT:      Head: Normocephalic and atraumatic.      Right Ear: Tympanic membrane normal.      Left Ear: Tympanic membrane normal.      Nose: Nose normal.      Mouth/Throat:      Mouth: Mucous membranes are moist.      Pharynx: Oropharynx is clear.   Eyes:      Extraocular Movements: Extraocular movements intact.      Conjunctiva/sclera: Conjunctivae normal.      Pupils: Pupils are equal, round, and reactive to light.   Cardiovascular:      Rate and Rhythm: Normal rate and regular rhythm.      Pulses: Normal pulses.      Heart sounds: Normal heart sounds. No murmur heard.  Pulmonary:      Effort: Pulmonary effort is normal. No respiratory distress.      Breath sounds: Normal breath sounds. No wheezing, rhonchi or rales.   Abdominal:      General: Bowel sounds are normal. There is no distension.      Palpations: Abdomen is soft.      Tenderness: There is no abdominal tenderness.   Genitourinary:     Comments: Deferred.  Musculoskeletal:         General: No swelling or tenderness. Normal range of motion.      Cervical back: Normal range of motion and neck supple.   Skin:     General: Skin is warm and dry.      Capillary Refill: Capillary refill takes less than 2 seconds.      Comments: Right foot dressing clean dry intact.   Neurological:      General: No focal deficit present.      Mental Status: She is alert and oriented to person, place, and time.   Psychiatric:         Mood and Affect:  "Mood normal.         Behavior: Behavior normal.       Last Recorded Vitals  Blood pressure (!) 147/46, pulse 67, temperature 36.9 °C (98.4 °F), temperature source Oral, resp. rate 16, height 1.676 m (5' 6\"), weight 86.2 kg (190 lb), SpO2 94%.    Intake/Output last 3 Shifts:  I/O last 3 completed shifts:  In: 740 (8.6 mL/kg) [P.O.:240; IV Piggyback:500]  Out: - (0 mL/kg)   Weight: 86.2 kg     Relevant Results  Results for orders placed or performed during the hospital encounter of 05/31/24 (from the past 24 hour(s))   POCT GLUCOSE   Result Value Ref Range    POCT Glucose 114 (H) 74 - 99 mg/dL   POCT GLUCOSE   Result Value Ref Range    POCT Glucose 141 (H) 74 - 99 mg/dL   Vancomycin   Result Value Ref Range    Vancomycin 31.6 (H) 10.0 - 20.0 ug/mL   CBC and Auto Differential   Result Value Ref Range    WBC 8.5 4.4 - 11.3 x10*3/uL    nRBC 0.0 0.0 - 0.0 /100 WBCs    RBC 3.03 (L) 4.00 - 5.20 x10*6/uL    Hemoglobin 10.3 (L) 12.0 - 16.0 g/dL    Hematocrit 30.9 (L) 36.0 - 46.0 %     (H) 80 - 100 fL    MCH 34.0 26.0 - 34.0 pg    MCHC 33.3 32.0 - 36.0 g/dL    RDW 12.0 11.5 - 14.5 %    Platelets 208 150 - 450 x10*3/uL    Neutrophils % 74.9 40.0 - 80.0 %    Immature Granulocytes %, Automated 0.9 0.0 - 0.9 %    Lymphocytes % 13.9 13.0 - 44.0 %    Monocytes % 8.4 2.0 - 10.0 %    Eosinophils % 1.3 0.0 - 6.0 %    Basophils % 0.6 0.0 - 2.0 %    Neutrophils Absolute 6.35 1.20 - 7.70 x10*3/uL    Immature Granulocytes Absolute, Automated 0.08 0.00 - 0.70 x10*3/uL    Lymphocytes Absolute 1.18 (L) 1.20 - 4.80 x10*3/uL    Monocytes Absolute 0.71 0.10 - 1.00 x10*3/uL    Eosinophils Absolute 0.11 0.00 - 0.70 x10*3/uL    Basophils Absolute 0.05 0.00 - 0.10 x10*3/uL   Basic Metabolic Panel   Result Value Ref Range    Glucose 131 (H) 65 - 99 mg/dL    Sodium 136 133 - 145 mmol/L    Potassium 3.7 3.4 - 5.1 mmol/L    Chloride 99 97 - 107 mmol/L    Bicarbonate 20 (L) 24 - 31 mmol/L    Urea Nitrogen 27 (H) 8 - 25 mg/dL    Creatinine 1.20 " 0.40 - 1.60 mg/dL    eGFR 49 (L) >60 mL/min/1.73m*2    Calcium 9.0 8.5 - 10.4 mg/dL    Anion Gap 17 <=19 mmol/L   POCT GLUCOSE   Result Value Ref Range    POCT Glucose 204 (H) 74 - 99 mg/dL   POCT GLUCOSE   Result Value Ref Range    POCT Glucose 176 (H) 74 - 99 mg/dL     Susceptibility data from last 90 days.  Collected Specimen Info Organism   06/01/24 Tissue/Biopsy from Wound/Tissue Staphylococcus aureus     No results found.    Scheduled medications  amLODIPine, 10 mg, oral, Daily  aspirin, 81 mg, oral, Daily  atenolol, 50 mg, oral, Daily  atorvastatin, 80 mg, oral, Nightly  cephalexin, 500 mg, oral, q6h  [Held by provider] chlorthalidone, 25 mg, oral, Daily  cilostazol, 50 mg, oral, BID  clopidogrel, 75 mg, oral, Daily  ezetimibe, 10 mg, oral, Daily  [Held by provider] furosemide, 20 mg, oral, Daily  heparin (porcine), 5,000 Units, subcutaneous, q8h LAURO  insulin lispro, 0-15 Units, subcutaneous, TID  metFORMIN, 1,000 mg, oral, BID  pantoprazole, 40 mg, oral, Daily before breakfast  psyllium, 1 packet, oral, Daily  [Held by provider] valsartan, 80 mg, oral, Daily      Continuous medications     PRN medications  PRN medications: acetaminophen **OR** acetaminophen **OR** acetaminophen, dextrose, dextrose, glucagon, glucagon, HYDROcodone-acetaminophen, melatonin, ondansetron **OR** ondansetron      ASSESSMENT:  Right foot foreign body (glass) status post removal   Right foot cellulitis improved  Leukocytosis resolved  Hypertensive urgency resolved  Hypertension  Renal artery stenosis  Hyperlipidemia  Peripheral vascular disease  History of tobacco use  Type 2 diabetes mellitus  Neuropathy  History of breast cancer  Psoriasis  Abnormal renal function  Microcytic anemia    PLAN:  Patient is doing well this morning.  No new issues.  Podiatry and Infectious disease input appreciated.  Right foot wound culture reviewed, pending Staph aureus.  Afebrile.  No leukocytosis.  Non-toxic appearing.  Follow cultures.  Monitor  temperature and white blood cell count.  Continue broad-spectrum IV antibiotics per Infectious disease.  Follow-up.  Local wound care per Podiatry.  Activity per Podiatry:  Right foot weightbearing as tolerated right surgical shoe.  Outpatient follow-up in 1 week.  Change dressing every 3 days with gauze.  Labs reviewed.  BUN and creatinine slightly increased today.  Encouraged fluid intake.  Hold Lasix, resume in 3 days.  BMP in 1 week with PCP.  CBC reviewed.  H&H stable.  No evidence of acute blood loss.  Check iron studies.  Outpatient follow-up with PCP for microcytic anemia.  Point-of-care glucose reviewed.  Monitor point-of-care glucose AC/HS.  Continue home medications.  Diet, weight loss, exercise education.  PT/OT.  Fall precautions.  DVT prophylaxis.  Heparin subcutaneous.  GI prophylaxis.  PPI Protonix.  Supportive care.  Patient reassured.  Case management following for discharge planning.  Discharge plan home.  Anticipate discharge home after cleared by Infectious disease.    ADDENDUM:  Per Infectious disease, gabby Bond to discharge on p.o. Cephalexin x 1 week.  Discussed with Dr. Lang.  Gabby to discharge home.  See discharge orders and instructions.      Humaira Malave, TERI-CNP

## 2024-06-03 NOTE — CARE PLAN
The patient's goals for the shift include decrease pain    The clinical goals for the shift include pain management, safety      Problem: Pain - Adult  Goal: Verbalizes/displays adequate comfort level or baseline comfort level  Outcome: Met     Problem: Safety - Adult  Goal: Free from fall injury  Outcome: Met     Problem: Discharge Planning  Goal: Discharge to home or other facility with appropriate resources  Outcome: Met     Problem: Chronic Conditions and Co-morbidities  Goal: Patient's chronic conditions and co-morbidity symptoms are monitored and maintained or improved  Outcome: Met     Problem: Skin  Goal: Decreased wound size/increased tissue granulation at next dressing change  Outcome: Met  Goal: Participates in plan/prevention/treatment measures  Outcome: Met  Goal: Prevent/manage excess moisture  Outcome: Met  Goal: Prevent/minimize sheer/friction injuries  Outcome: Met  Goal: Promote/optimize nutrition  Outcome: Met  Goal: Promote skin healing  Outcome: Met     Problem: Pain  Goal: LTG - Patient will manage pain with the appropriate technique/Intervention  Outcome: Met

## 2024-06-03 NOTE — PROGRESS NOTES
INFECTIOUS DISEASES PROGRESS NOTE    Consulted / following patient for:  Right foot cellulitis    Subjective   Interval History:   Notes marked improvement in the erythema, swelling, and discomfort of her right foot     Objective   PHYSICAL EXAMINATION  Vital signs:  Visit Vitals  BP (!) 147/46 (BP Location: Left arm, Patient Position: Lying)   Pulse 67   Temp 36.9 °C (98.4 °F) (Oral)   Resp 16      General: Alert, not toxic  Extremities: Right foot with some residual erythema over the hallux and instep.  No proximal lymphangitis or regional lymphadenopathy.  No suppuration or malodor.  Well-perfused.    Relevant Results  WBC: 12,600 --> 8500    Results from last 72 hours   Lab Units 06/03/24  0531   CREATININE mg/dL 1.20   ANION GAP mmol/L 17   EGFR mL/min/1.73m*2 49*     Results from last 72 hours   Lab Units 06/01/24  0634   AST U/L 9   ALT U/L 6   ALK PHOS U/L 57   BILIRUBIN TOTAL mg/dL 0.7     Microbiology:  Blood: Negative X2  Right foot wound: MSSA      Assessment:  Right foot wound due to retained piece of glass, MSSA.  Marked improvement.  Reviewed with Dr. Funes       Plan/Recommendations:  Change antibiotic to cephalexin 500 mg 4 times daily for 7 more days  I will send prescription to her community pharmacy  Follow-up 1 week with Dr. Funes  Follow-up with me prn    Text message sent to Ms. Malave  WILL SIGN OFF.  PLEASE RE-CONSULT PRN.  THANK YOU.    Olman Coleman MD  [email or web address]  Office:  [phone]

## 2024-06-03 NOTE — PROGRESS NOTES
Vancomycin Dosing by Pharmacy- FOLLOW UP    Lottie Rodríguez is a 68 y.o. year old female who Pharmacy has been consulted for vancomycin dosing for cellulitis, skin and soft tissue. Based on the patient's indication and renal status this patient is being dosed based on a goal AUC of 400-600.     Renal function is currently stable.    Current vancomycin dose: 1250 mg given every 24 hours    Estimated vancomycin AUC on current dose: 645 mg/L.hr     Visit Vitals  BP (!) 147/46 (BP Location: Left arm, Patient Position: Lying)   Pulse 67   Temp 36.9 °C (98.4 °F) (Oral)   Resp 16        Lab Results   Component Value Date    CREATININE 1.20 2024    CREATININE 0.90 2024    CREATININE 0.80 2024    CREATININE 0.80 2024        Patient weight is as follows:   Vitals:    24 1253   Weight: 86.2 kg (190 lb)       Cultures:  Susceptibility data for the encounter in last 14 days.  Collected Specimen Info Organism   24 Tissue/Biopsy from Wound/Tissue Staphylococcus aureus        I/O last 3 completed shifts:  In: 740 (8.6 mL/kg) [P.O.:240; IV Piggyback:500]  Out: - (0 mL/kg)   Weight: 86.2 kg   I/O during current shift:  No intake/output data recorded.    Temp (24hrs), Av.1 °C (98.7 °F), Min:36.9 °C (98.4 °F), Max:37.4 °C (99.3 °F)      Assessment/Plan    Above goal AUC. Orders placed for new vancomcyin regimen of 1000 every 24 hours to begin at 0000.    This dosing regimen is predicted by [employer] to result in the following pharmacokinetic parameters:    Regimen: 1000 mg IV every 24 hours.  Start time: 00:06 on 2024  Exposure target: AUC24 (range)400-600 mg/L.hr   AUC24,ss: 523 mg/L.hr  Probability of AUC24 > 400: 95 %  Ctrough,ss: 15.6 mg/L  Probability of Ctrough,ss > 20: 16 %  Probability of nephrotoxicity (Lodise VIDYA ): 11 %      The next level will be obtained on  at 0500. May be obtained sooner if clinically indicated.   Will continue to monitor renal function daily while on  vancomycin and order serum creatinine at least every 48 hours if not already ordered.  Follow for continued vancomycin needs, clinical response, and signs/symptoms of toxicity.       Alex Hurd, [facility]

## 2024-06-03 NOTE — CARE PLAN
Problem: Pain - Adult  Goal: Verbalizes/displays adequate comfort level or baseline comfort level  Outcome: Progressing     Problem: Safety - Adult  Goal: Free from fall injury  Outcome: Progressing     Problem: Discharge Planning  Goal: Discharge to home or other facility with appropriate resources  Outcome: Progressing     Problem: Chronic Conditions and Co-morbidities  Goal: Patient's chronic conditions and co-morbidity symptoms are monitored and maintained or improved  Outcome: Progressing     Problem: Skin  Goal: Decreased wound size/increased tissue granulation at next dressing change  Outcome: Progressing  Flowsheets (Taken 6/2/2024 2334)  Decreased wound size/increased tissue granulation at next dressing change:   Promote sleep for wound healing   Protective dressings over bony prominences   Utilize specialty bed per algorithm  Goal: Participates in plan/prevention/treatment measures  Outcome: Progressing  Flowsheets (Taken 6/2/2024 2334)  Participates in plan/prevention/treatment measures:   Discuss with provider PT/OT consult   Elevate heels   Increase activity/out of bed for meals  Goal: Prevent/manage excess moisture  Outcome: Progressing  Flowsheets (Taken 6/2/2024 2334)  Prevent/manage excess moisture:   Cleanse incontinence/protect with barrier cream   Follow provider orders for dressing changes   Moisturize dry skin   Monitor for/manage infection if present   Use wicking fabric (obtain order)  Goal: Prevent/minimize sheer/friction injuries  Outcome: Progressing  Flowsheets (Taken 6/2/2024 2334)  Prevent/minimize sheer/friction injuries:   Complete micro-shifts as needed if patient unable. Adjust patient position to relieve pressure points, not a full turn   HOB 30 degrees or less   Increase activity/out of bed for meals   Turn/reposition every 2 hours/use positioning/transfer devices   Use pull sheet   Utilize specialty bed per algorithm  Goal: Promote/optimize nutrition  Outcome:  Progressing  Flowsheets (Taken 6/2/2024 2334)  Promote/optimize nutrition:   Assist with feeding   Consume > 50% meals/supplements   Discuss with provider if NPO > 2 days   Monitor/record intake including meals   Offer water/supplements/favorite foods   Reassess MST if dietician not consulted  Goal: Promote skin healing  Outcome: Progressing  Flowsheets (Taken 6/2/2024 2334)  Promote skin healing:   Assess skin/pad under line(s)/device(s)   Ensure correct size (line/device) and apply per  instructions   Protective dressings over bony prominences   Rotate device position/do not position patient on device   Turn/reposition every 2 hours/use positioning/transfer devices     Problem: Pain  Goal: LTG - Patient will manage pain with the appropriate technique/Intervention  Outcome: Progressing   The patient's goals for the shift include decrease pain    The clinical goals for the shift include no fall and manage pain    Over the shift, the patient did not make progress toward the following goals. Barriers to progression include . Recommendations to address these barriers include .

## 2024-06-03 NOTE — CARE PLAN
Problem: Pain - Adult  Goal: Verbalizes/displays adequate comfort level or baseline comfort level  6/2/2024 2348 by Jacqueline Chaves RN  Outcome: Progressing  6/2/2024 2334 by Jacqueline Chaves RN  Outcome: Progressing     Problem: Safety - Adult  Goal: Free from fall injury  6/2/2024 2348 by Jacqueline Chaves RN  Outcome: Progressing  6/2/2024 2334 by Jacqueline Chaves RN  Outcome: Progressing     Problem: Discharge Planning  Goal: Discharge to home or other facility with appropriate resources  6/2/2024 2348 by Jacqueline Chaves RN  Outcome: Progressing  6/2/2024 2334 by Jacqueline Chaves RN  Outcome: Progressing     Problem: Chronic Conditions and Co-morbidities  Goal: Patient's chronic conditions and co-morbidity symptoms are monitored and maintained or improved  6/2/2024 2348 by Jacqueline Chaves RN  Outcome: Progressing  6/2/2024 2334 by Jacqueline Chaves RN  Outcome: Progressing     Problem: Skin  Goal: Decreased wound size/increased tissue granulation at next dressing change  6/2/2024 2348 by Jacqueline Chaves RN  Outcome: Progressing  Flowsheets (Taken 6/2/2024 2348)  Decreased wound size/increased tissue granulation at next dressing change:   Promote sleep for wound healing   Protective dressings over bony prominences   Utilize specialty bed per algorithm  6/2/2024 2334 by Jacqueline Chaves RN  Outcome: Progressing  Flowsheets (Taken 6/2/2024 2334)  Decreased wound size/increased tissue granulation at next dressing change:   Promote sleep for wound healing   Protective dressings over bony prominences   Utilize specialty bed per algorithm  Goal: Participates in plan/prevention/treatment measures  6/2/2024 2348 by Jacqueline Chaves RN  Outcome: Progressing  Flowsheets (Taken 6/2/2024 2348)  Participates in plan/prevention/treatment measures:   Discuss with provider  PT/OT consult   Elevate heels   Increase activity/out of bed for meals  6/2/2024 2334 by Jacqueline Chaves RN  Outcome: Progressing  Flowsheets (Taken 6/2/2024 2334)  Participates in plan/prevention/treatment measures:   Discuss with provider PT/OT consult   Elevate heels   Increase activity/out of bed for meals  Goal: Prevent/manage excess moisture  6/2/2024 2348 by Jacqueline Chaves RN  Outcome: Progressing  Flowsheets (Taken 6/2/2024 2348)  Prevent/manage excess moisture:   Cleanse incontinence/protect with barrier cream   Follow provider orders for dressing changes   Moisturize dry skin   Monitor for/manage infection if present   Use wicking fabric (obtain order)  6/2/2024 2334 by Jacqueline Chaves RN  Outcome: Progressing  Flowsheets (Taken 6/2/2024 2334)  Prevent/manage excess moisture:   Cleanse incontinence/protect with barrier cream   Follow provider orders for dressing changes   Moisturize dry skin   Monitor for/manage infection if present   Use wicking fabric (obtain order)  Goal: Prevent/minimize sheer/friction injuries  6/2/2024 2348 by Jacqueline Chaves RN  Outcome: Progressing  Flowsheets (Taken 6/2/2024 2348)  Prevent/minimize sheer/friction injuries:   Complete micro-shifts as needed if patient unable. Adjust patient position to relieve pressure points, not a full turn   HOB 30 degrees or less   Increase activity/out of bed for meals   Turn/reposition every 2 hours/use positioning/transfer devices   Use pull sheet   Utilize specialty bed per algorithm  6/2/2024 2334 by Jacqueline Chaves RN  Outcome: Progressing  Flowsheets (Taken 6/2/2024 2334)  Prevent/minimize sheer/friction injuries:   Complete micro-shifts as needed if patient unable. Adjust patient position to relieve pressure points, not a full turn   HOB 30 degrees or less   Increase activity/out of bed for meals   Turn/reposition every 2 hours/use positioning/transfer devices    Use pull sheet   Utilize specialty bed per algorithm  Goal: Promote/optimize nutrition  6/2/2024 2348 by Jacqueline Chaves RN  Outcome: Progressing  Flowsheets (Taken 6/2/2024 2348)  Promote/optimize nutrition:   Assist with feeding   Consume > 50% meals/supplements   Discuss with provider if NPO > 2 days   Offer water/supplements/favorite foods   Monitor/record intake including meals   Reassess MST if dietician not consulted  6/2/2024 2334 by Jacqueline Chaves RN  Outcome: Progressing  Flowsheets (Taken 6/2/2024 2334)  Promote/optimize nutrition:   Assist with feeding   Consume > 50% meals/supplements   Discuss with provider if NPO > 2 days   Monitor/record intake including meals   Offer water/supplements/favorite foods   Reassess MST if dietician not consulted  Goal: Promote skin healing  6/2/2024 2348 by Jacqueline Chaves RN  Outcome: Progressing  Flowsheets (Taken 6/2/2024 2348)  Promote skin healing:   Assess skin/pad under line(s)/device(s)   Ensure correct size (line/device) and apply per  instructions   Protective dressings over bony prominences   Rotate device position/do not position patient on device   Turn/reposition every 2 hours/use positioning/transfer devices  6/2/2024 2334 by Jacqueline Chaves RN  Outcome: Progressing  Flowsheets (Taken 6/2/2024 2334)  Promote skin healing:   Assess skin/pad under line(s)/device(s)   Ensure correct size (line/device) and apply per  instructions   Protective dressings over bony prominences   Rotate device position/do not position patient on device   Turn/reposition every 2 hours/use positioning/transfer devices     Problem: Pain  Goal: LTG - Patient will manage pain with the appropriate technique/Intervention  6/2/2024 2348 by Jacqueline Chaves RN  Outcome: Progressing  6/2/2024 2334 by Jacqueline Chaves RN  Outcome: Progressing   The patient's goals for the shift  include decrease pain    The clinical goals for the shift include no falls during this shift, pain management    Over the shift, the patient did not make progress toward the following goals. Barriers to progression include . Recommendations to address these barriers include .

## 2024-06-04 LAB
BACTERIA BLD CULT: NORMAL
BACTERIA BLD CULT: NORMAL
BACTERIA SPEC CULT: ABNORMAL
GRAM STN SPEC: ABNORMAL
GRAM STN SPEC: ABNORMAL

## 2024-06-07 NOTE — DISCHARGE SUMMARY
Discharge Diagnosis  Right foot foreign body (glass) status post removal   Right foot cellulitis improved  Leukocytosis resolved  Hypertensive urgency resolved  Hypertension  Renal artery stenosis  Hyperlipidemia  Peripheral vascular disease  History of tobacco use  Type 2 diabetes mellitus  Neuropathy  History of breast cancer  Psoriasis  Abnormal renal function  Microcytic anemia    Issues Requiring Follow-Up  Above    Discharge Meds     Your medication list        START taking these medications        Instructions Last Dose Given Next Dose Due   cephalexin 500 mg capsule  Commonly known as: Keflex      Take 1 capsule (500 mg) by mouth every 6 hours for 28 doses.              CHANGE how you take these medications        Instructions Last Dose Given Next Dose Due   furosemide 20 mg tablet  Commonly known as: Lasix  Start taking on: June 6, 2024  What changed:   how much to take  when to take this  additional instructions  These instructions start on June 6, 2024. If you are unsure what to do until then, ask your doctor or other care provider.      Take 1 tablet (20 mg) by mouth once daily. Hold. Resume 6/6/2024. Do not fill before June 6, 2024.              CONTINUE taking these medications        Instructions Last Dose Given Next Dose Due   acetaminophen 650 mg ER tablet  Commonly known as: Tylenol 8 HOUR           amLODIPine 10 mg tablet  Commonly known as: Norvasc           aspirin 81 mg EC tablet           atenoloL-chlorthalidone 50-25 mg tablet  Commonly known as: Tenoretic           atorvastatin 80 mg tablet  Commonly known as: Lipitor           cilostazol 50 mg tablet  Commonly known as: Pletal           clopidogrel 75 mg tablet  Commonly known as: Plavix           ezetimibe 10 mg tablet  Commonly known as: Zetia           gabapentin 300 mg capsule  Commonly known as: Neurontin           metFORMIN 1,000 mg tablet  Commonly known as: Glucophage           multivitamin tablet           olmesartan 20 mg  tablet  Commonly known as: BENIcar                     Where to Get Your Medications        These medications were sent to [facility] [address] [place] [address] [id number] [id number]      Phone: [phone]   cephalexin 500 mg capsule       Information about where to get these medications is not yet available    Ask your nurse or doctor about these medications  furosemide 20 mg tablet         Test Results Pending At Discharge  Pending Labs       No current pending labs.            Hospital Course  This is a very pleasant 68-year-old  female with a past medical history significant for hypertension, diabetes mellitus, and neuropathy presented to the emergency department with right foot pain, swelling and redness.  She was in her usual state of health until 2 days prior to admission.  She accidentally dropped a glass which shattered on the floor.  She cleared the glass up from the floor.  On the date of presentation, she developed right foot pain, swelling and redness.  She had trouble walking on the foot due to the pain.  She did not recall stepping on any glass or foreign body though wondered if she had glass in her foot due to her symptoms.  She presented to the urgent care.  X-ray imaging showed a foreign body and she was sent to the emergency department for evaluation.  Urgent care x-ray imaging showed a radiopaque foreign body within the plantar subcutaneous soft tissue at the level of the metatarsal head.  In the emergency department, she was hypertensive with a systolic blood pressure over 200.  She did not take her antihypertensives on that date.  Laboratory data showed a glucose of 166.  BUN 15.  Creatinine 0.8.  CBC showed a white blood cell count of 12.6.  Lactate of 1.9.  Blood cultures x 2 were obtained.  She was treated in the emergency department with IV Zosyn which she tolerated and IV Vancomycin and was admitted.  She was evaluated and treated by Infectious  disease and Podiatry.  She was treated with broad-spectrum IV antibiotics.  A 1.5 cm piece of glass was removed from the plantar aspect of the right foot by Podiatry.  She was treated with local wound care per Podiatry.  She was treated with analgesics.  She was evaluated by physical and occupational therapy.  Her overall condition improved.  She was cleared by Podiatry for discharge with local skin care: change dressing every 3 days with gauze, right foot weight bearing as tolerated with surgical shoe and outpatient follow-up.  The right foot wound culture grew MSSA.  She was cleared by Infectious disease for discharge on oral Cephalexin x 1 week.  Case management was consulted for discharge planning.  She was discharged home in stable condition.    Pertinent Physical Exam At Time of Discharge  See physical examination.     Outpatient Follow-Up  No future appointments.    Follow-up with primary care provider in 1 week.    Follow-up with Podiatry in 1 week.    Humaira Malave, TERI-CNP

## 2024-07-17 DIAGNOSIS — E87.5 HYPERKALEMIA: Primary | ICD-10-CM

## 2024-07-26 ENCOUNTER — LAB (OUTPATIENT)
Dept: LAB | Facility: LAB | Age: 69
End: 2024-07-26
Payer: MEDICARE

## 2024-07-26 DIAGNOSIS — E87.5 HYPERKALEMIA: ICD-10-CM

## 2024-07-26 DIAGNOSIS — D50.9 MICROCYTIC ANEMIA: ICD-10-CM

## 2024-07-26 LAB
ANION GAP SERPL CALC-SCNC: 12 MMOL/L
BUN SERPL-MCNC: 15 MG/DL (ref 8–25)
CALCIUM SERPL-MCNC: 9.7 MG/DL (ref 8.5–10.4)
CHLORIDE SERPL-SCNC: 103 MMOL/L (ref 97–107)
CO2 SERPL-SCNC: 24 MMOL/L (ref 24–31)
CREAT SERPL-MCNC: 0.9 MG/DL (ref 0.4–1.6)
EGFRCR SERPLBLD CKD-EPI 2021: 69 ML/MIN/1.73M*2
ERYTHROCYTE [DISTWIDTH] IN BLOOD BY AUTOMATED COUNT: 12.8 % (ref 11.5–14.5)
GLUCOSE SERPL-MCNC: 134 MG/DL (ref 65–99)
HCT VFR BLD AUTO: 35.8 % (ref 36–46)
HGB BLD-MCNC: 11.4 G/DL (ref 12–16)
MCH RBC QN AUTO: 33.4 PG (ref 26–34)
MCHC RBC AUTO-ENTMCNC: 31.8 G/DL (ref 32–36)
MCV RBC AUTO: 105 FL (ref 80–100)
NRBC BLD-RTO: 0 /100 WBCS (ref 0–0)
PLATELET # BLD AUTO: 227 X10*3/UL (ref 150–450)
POTASSIUM SERPL-SCNC: 5.8 MMOL/L (ref 3.4–5.1)
RBC # BLD AUTO: 3.41 X10*6/UL (ref 4–5.2)
SODIUM SERPL-SCNC: 139 MMOL/L (ref 133–145)
WBC # BLD AUTO: 7.2 X10*3/UL (ref 4.4–11.3)

## 2024-07-26 PROCEDURE — 36415 COLL VENOUS BLD VENIPUNCTURE: CPT

## 2024-07-26 PROCEDURE — 80048 BASIC METABOLIC PNL TOTAL CA: CPT

## 2024-07-26 PROCEDURE — 85027 COMPLETE CBC AUTOMATED: CPT

## 2024-10-29 ENCOUNTER — LAB (OUTPATIENT)
Dept: LAB | Facility: LAB | Age: 69
End: 2024-10-29
Payer: MEDICARE

## 2024-10-29 ENCOUNTER — PHARMACY VISIT (OUTPATIENT)
Dept: PHARMACY | Facility: CLINIC | Age: 69
End: 2024-10-29
Payer: COMMERCIAL

## 2024-10-29 DIAGNOSIS — E87.5 HYPERKALEMIA: ICD-10-CM

## 2024-10-29 DIAGNOSIS — E78.2 MIXED HYPERLIPIDEMIA: Primary | ICD-10-CM

## 2024-10-29 LAB
ANION GAP SERPL CALCULATED.3IONS-SCNC: 12 MMOL/L (ref 10–20)
BUN SERPL-MCNC: 22 MG/DL (ref 6–23)
CALCIUM SERPL-MCNC: 9.5 MG/DL (ref 8.6–10.3)
CHLORIDE SERPL-SCNC: 104 MMOL/L (ref 98–107)
CO2 SERPL-SCNC: 26 MMOL/L (ref 21–32)
CREAT SERPL-MCNC: 0.89 MG/DL (ref 0.5–1.05)
EGFRCR SERPLBLD CKD-EPI 2021: 70 ML/MIN/1.73M*2
GLUCOSE SERPL-MCNC: 142 MG/DL (ref 74–99)
LDLC SERPL DIRECT ASSAY-MCNC: 63 MG/DL (ref 0–129)
POTASSIUM SERPL-SCNC: 6.2 MMOL/L (ref 3.5–5.3)
SODIUM SERPL-SCNC: 136 MMOL/L (ref 136–145)

## 2024-10-29 PROCEDURE — 36415 COLL VENOUS BLD VENIPUNCTURE: CPT

## 2024-10-29 PROCEDURE — RXMED WILLOW AMBULATORY MEDICATION CHARGE

## 2024-10-29 PROCEDURE — 83721 ASSAY OF BLOOD LIPOPROTEIN: CPT

## 2024-10-29 PROCEDURE — 80048 BASIC METABOLIC PNL TOTAL CA: CPT

## 2024-10-29 RX ORDER — SODIUM POLYSTYRENE SULFONATE 15 G/60ML
SUSPENSION ORAL; RECTAL
Qty: 120 ML | Refills: 0 | OUTPATIENT
Start: 2024-10-29

## 2024-10-30 ENCOUNTER — LAB (OUTPATIENT)
Dept: LAB | Facility: LAB | Age: 69
End: 2024-10-30
Payer: MEDICARE

## 2024-10-30 DIAGNOSIS — N28.9 ABNORMAL RENAL FUNCTION: ICD-10-CM

## 2024-10-30 LAB
ANION GAP SERPL CALCULATED.3IONS-SCNC: 15 MMOL/L (ref 10–20)
BUN SERPL-MCNC: 25 MG/DL (ref 6–23)
CALCIUM SERPL-MCNC: 9.5 MG/DL (ref 8.6–10.3)
CHLORIDE SERPL-SCNC: 107 MMOL/L (ref 98–107)
CO2 SERPL-SCNC: 23 MMOL/L (ref 21–32)
CREAT SERPL-MCNC: 1.03 MG/DL (ref 0.5–1.05)
EGFRCR SERPLBLD CKD-EPI 2021: 59 ML/MIN/1.73M*2
GLUCOSE SERPL-MCNC: 121 MG/DL (ref 74–99)
POTASSIUM SERPL-SCNC: 4.8 MMOL/L (ref 3.5–5.3)
SODIUM SERPL-SCNC: 140 MMOL/L (ref 136–145)

## 2024-10-30 PROCEDURE — 80048 BASIC METABOLIC PNL TOTAL CA: CPT

## 2024-10-30 PROCEDURE — 36415 COLL VENOUS BLD VENIPUNCTURE: CPT

## 2025-06-25 ENCOUNTER — HOSPITAL ENCOUNTER (OUTPATIENT)
Dept: RADIOLOGY | Facility: HOSPITAL | Age: 70
Discharge: HOME | End: 2025-06-25
Payer: MEDICARE

## 2025-06-25 DIAGNOSIS — Z12.31 ENCOUNTER FOR SCREENING MAMMOGRAM FOR MALIGNANT NEOPLASM OF BREAST: ICD-10-CM

## 2025-06-25 PROCEDURE — 77063 BREAST TOMOSYNTHESIS BI: CPT

## 2025-06-25 PROCEDURE — 77063 BREAST TOMOSYNTHESIS BI: CPT | Performed by: RADIOLOGY

## 2025-06-25 PROCEDURE — 77067 SCR MAMMO BI INCL CAD: CPT | Performed by: RADIOLOGY
